# Patient Record
Sex: MALE | Race: WHITE | Employment: OTHER | URBAN - METROPOLITAN AREA
[De-identification: names, ages, dates, MRNs, and addresses within clinical notes are randomized per-mention and may not be internally consistent; named-entity substitution may affect disease eponyms.]

---

## 2018-08-09 ENCOUNTER — TELEPHONE (OUTPATIENT)
Dept: FAMILY MEDICINE CLINIC | Age: 83
End: 2018-08-09

## 2018-08-09 ENCOUNTER — OFFICE VISIT (OUTPATIENT)
Dept: FAMILY MEDICINE CLINIC | Age: 83
End: 2018-08-09

## 2018-08-09 VITALS
HEIGHT: 62 IN | WEIGHT: 112.6 LBS | BODY MASS INDEX: 20.72 KG/M2 | HEART RATE: 91 BPM | DIASTOLIC BLOOD PRESSURE: 68 MMHG | SYSTOLIC BLOOD PRESSURE: 112 MMHG | OXYGEN SATURATION: 97 % | RESPIRATION RATE: 16 BRPM

## 2018-08-09 DIAGNOSIS — I10 ESSENTIAL HYPERTENSION: ICD-10-CM

## 2018-08-09 DIAGNOSIS — Z91.81 HISTORY OF RECENT FALL: ICD-10-CM

## 2018-08-09 DIAGNOSIS — Z79.899 ON POTASSIUM WASTING DIURETIC THERAPY: ICD-10-CM

## 2018-08-09 DIAGNOSIS — J30.2 SEASONAL ALLERGIES: ICD-10-CM

## 2018-08-09 DIAGNOSIS — Z00.00 HEALTHCARE MAINTENANCE: ICD-10-CM

## 2018-08-09 DIAGNOSIS — R01.1 HEART MURMUR: ICD-10-CM

## 2018-08-09 DIAGNOSIS — R29.898 WEAKNESS OF BOTH LOWER EXTREMITIES: ICD-10-CM

## 2018-08-09 DIAGNOSIS — R41.3 MEMORY LOSS: ICD-10-CM

## 2018-08-09 DIAGNOSIS — R54 AGE-RELATED PHYSICAL DEBILITY: ICD-10-CM

## 2018-08-09 DIAGNOSIS — R23.3 EASY BRUISING: ICD-10-CM

## 2018-08-09 DIAGNOSIS — Z71.89 ADVANCED DIRECTIVES, COUNSELING/DISCUSSION: ICD-10-CM

## 2018-08-09 DIAGNOSIS — Z76.89 ESTABLISHING CARE WITH NEW DOCTOR, ENCOUNTER FOR: Primary | ICD-10-CM

## 2018-08-09 LAB
BASOPHILS ABSOLUTE: 0.1 K/UL (ref 0–0.2)
BASOPHILS RELATIVE PERCENT: 0.9 %
EOSINOPHILS ABSOLUTE: 0.2 K/UL (ref 0–0.6)
EOSINOPHILS RELATIVE PERCENT: 2.2 %
HCT VFR BLD CALC: 35.8 % (ref 40.5–52.5)
HEMOGLOBIN: 11.9 G/DL (ref 13.5–17.5)
LYMPHOCYTES ABSOLUTE: 1.6 K/UL (ref 1–5.1)
LYMPHOCYTES RELATIVE PERCENT: 23.9 %
MCH RBC QN AUTO: 31.4 PG (ref 26–34)
MCHC RBC AUTO-ENTMCNC: 33.4 G/DL (ref 31–36)
MCV RBC AUTO: 94 FL (ref 80–100)
MONOCYTES ABSOLUTE: 0.4 K/UL (ref 0–1.3)
MONOCYTES RELATIVE PERCENT: 5.6 %
NEUTROPHILS ABSOLUTE: 4.6 K/UL (ref 1.7–7.7)
NEUTROPHILS RELATIVE PERCENT: 67.4 %
PDW BLD-RTO: 14.9 % (ref 12.4–15.4)
PLATELET # BLD: 420 K/UL (ref 135–450)
PMV BLD AUTO: 8.3 FL (ref 5–10.5)
RBC # BLD: 3.8 M/UL (ref 4.2–5.9)
WBC # BLD: 6.9 K/UL (ref 4–11)

## 2018-08-09 PROCEDURE — 99387 INIT PM E/M NEW PAT 65+ YRS: CPT | Performed by: FAMILY MEDICINE

## 2018-08-09 RX ORDER — KETOCONAZOLE 20 MG/G
CREAM TOPICAL DAILY
COMMUNITY
End: 2018-11-06

## 2018-08-09 RX ORDER — ACETAMINOPHEN 500 MG
500 TABLET ORAL EVERY 6 HOURS PRN
COMMUNITY
End: 2020-01-01

## 2018-08-09 RX ORDER — AMLODIPINE BESYLATE 2.5 MG/1
5 TABLET ORAL DAILY
COMMUNITY
End: 2018-11-07 | Stop reason: SINTOL

## 2018-08-09 RX ORDER — POTASSIUM CHLORIDE 1.5 G/1.77G
20 POWDER, FOR SOLUTION ORAL DAILY
COMMUNITY
End: 2018-11-07

## 2018-08-09 RX ORDER — HYDROCHLOROTHIAZIDE 25 MG/1
12.5 TABLET ORAL DAILY
COMMUNITY
End: 2020-01-01

## 2018-08-09 RX ORDER — DONEPEZIL HYDROCHLORIDE 5 MG/1
5 TABLET, FILM COATED ORAL NIGHTLY
COMMUNITY
End: 2020-01-01

## 2018-08-09 RX ORDER — SULFACETAMIDE SODIUM 100 MG/G
OINTMENT OPHTHALMIC EVERY 6 HOURS
COMMUNITY
End: 2018-11-06

## 2018-08-09 RX ORDER — MIRTAZAPINE 15 MG/1
15 TABLET, FILM COATED ORAL NIGHTLY
COMMUNITY
End: 2018-11-06

## 2018-08-09 ASSESSMENT — ENCOUNTER SYMPTOMS
TROUBLE SWALLOWING: 0
SHORTNESS OF BREATH: 0
BLOOD IN STOOL: 0
CHEST TIGHTNESS: 0
COUGH: 1
COLOR CHANGE: 1
VOMITING: 0
NAUSEA: 0
SORE THROAT: 0
DIARRHEA: 0
RHINORRHEA: 0
CONSTIPATION: 0
ABDOMINAL PAIN: 0
BACK PAIN: 0

## 2018-08-09 ASSESSMENT — PATIENT HEALTH QUESTIONNAIRE - PHQ9
SUM OF ALL RESPONSES TO PHQ QUESTIONS 1-9: 0
1. LITTLE INTEREST OR PLEASURE IN DOING THINGS: 0
SUM OF ALL RESPONSES TO PHQ QUESTIONS 1-9: 0
2. FEELING DOWN, DEPRESSED OR HOPELESS: 0
SUM OF ALL RESPONSES TO PHQ9 QUESTIONS 1 & 2: 0

## 2018-08-09 NOTE — TELEPHONE ENCOUNTER
Hi!  This patient is newly established in our office. He came with his son, after spending a month in the hospital following a fall with subsequent rehab. The family is interested in setting up home health services and was told this can be done through our office. Can you help with this? ? I would appreciate any guidance. Thanks!

## 2018-08-09 NOTE — PROGRESS NOTES
Daphnie Nath  YOB: 1924    Date of Service:  8/9/2018    Chief Complaint:   Daphnie Nath is a 80 y.o. male who presents for complete physical examination and to establish care. HPI:    Lived in [place] by himself  Lorna Jose at home, likely mechanical but unwitnessed  Was in rehab and released Saturday   Unable to return home alone  Now lives with son, [facility] wanted him to be seen soon for [facility] orders    Was only taking 1 BP medication prior to hospitalization  He denies any recent medical history  Admitted to [facility]  Walking with a walker now  Unable to walk unassisted  Underwent inpt PT, but still feels weak and unsteady on his feet. Has had pneumococcal shot in the past  Gets flu shot every year   Unsure of other vaccines    Saw Rashaad Godfrey, JOHN  [address] [facility] [address]    Cough  Chronic  Nonproductive mostly, some phlegm, some orange but mostly white  No new or worsening SOB  No fever, WL  Never smoker, no Hx of COPD/Asthma     Exercise: no regular exercise at this time  Diet: appetite ok now,     Seatbelt use: yes   Guns in the home: no     Advanced Directives: discussed today     Wt Readings from Last 3 Encounters:   08/09/18 112 lb 9.6 oz (51.1 kg)     BP Readings from Last 3 Encounters:   08/09/18 112/68       Patient Active Problem List   Diagnosis    Essential hypertension    History of recent fall    Age-related physical debility    Healthcare maintenance    Easy bruising    Advanced directives, counseling/discussion       Health Maintenance   Topic Date Due    DTaP/Tdap/Td vaccine (1 - Tdap) 02[birth year]/1943    Shingles Vaccine (1 of 2 - 2 Dose Series) 02/12/1974    Pneumococcal low/med risk (1 of 2 - PCV13) 02/12/1989    Flu vaccine (1) 09/01/2018         There is no immunization history on file for this patient.     Allergies   Allergen Reactions    Claritin [Loratadine]      Current Outpatient Prescriptions   Medication Sig Dispense Refill    donepezil (ARICEPT) 5 MG tablet Take 5 mg by mouth nightly      mirtazapine (REMERON) 15 MG tablet Take 15 mg by mouth nightly      potassium chloride (KLOR-CON) 20 MEQ packet Take 20 mEq by mouth 2 times daily      amLODIPine (NORVASC) 2.5 MG tablet Take 2.5 mg by mouth daily      hydrochlorothiazide (HYDRODIURIL) 25 MG tablet Take 25 mg by mouth daily      acetaminophen (TYLENOL) 500 MG tablet Take 500 mg by mouth every 6 hours as needed for Pain      Fexofenadine HCl (ALLEGRA ALLERGY PO) Take by mouth      aspirin 81 MG tablet Take 81 mg by mouth daily      ketoconazole (NIZORAL) 2 % cream Apply topically daily Apply topically daily.  sulfacetamide (BLEPH-10) 10 % ophthalmic ointment every 6 hours Every 6 hours. No current facility-administered medications for this visit. Past Medical History:   Diagnosis Date    Allergic rhinitis     Dementia     Hypertension      Past Surgical History:   Procedure Laterality Date    APPENDECTOMY      PROSTATECTOMY  1998     Family History   Problem Relation Age of Onset    Family history unknown: Yes     Social History     Social History    Marital status: Unknown     Spouse name: N/A    Number of children: N/A    Years of education: N/A     Occupational History    Not on file. Social History Main Topics    Smoking status: Never Smoker    Smokeless tobacco: Never Used    Alcohol use No    Drug use: No    Sexual activity: No     Other Topics Concern    Not on file     Social History Narrative    No narrative on file       Review of Systems:  Review of Systems   Constitutional: Negative for activity change, appetite change, chills, diaphoresis, fatigue, fever and unexpected weight change. HENT: Negative for rhinorrhea, sore throat and trouble swallowing. Eyes: Negative for visual disturbance. Respiratory: Positive for cough (chronic). Negative for chest tightness and shortness of breath.     Cardiovascular: Positive for leg swelling (improved). Negative for chest pain and palpitations. Gastrointestinal: Negative for abdominal pain, blood in stool, constipation, diarrhea, nausea and vomiting. Genitourinary: Negative for decreased urine volume, difficulty urinating, discharge, dysuria, frequency, hematuria, penile swelling, scrotal swelling, testicular pain and urgency. Musculoskeletal: Positive for gait problem. Negative for arthralgias, back pain, joint swelling and myalgias. Skin: Positive for color change and wound. Negative for pallor and rash. Neurological: Negative for headaches. Hematological: Bruises/bleeds easily. Psychiatric/Behavioral: Positive for confusion. Negative for agitation, behavioral problems, decreased concentration, dysphoric mood, hallucinations, self-injury, sleep disturbance and suicidal ideas. The patient is not nervous/anxious and is not hyperactive. Physical Exam:   Vitals:    08/09/18 1334   BP: 112/68   Site: Right Arm   Position: Sitting   Cuff Size: Medium Adult   Pulse: 91   Resp: 16   SpO2: 97%   Weight: 112 lb 9.6 oz (51.1 kg)   Height: 5' 2\" (1.575 m)     Body mass index is 20.59 kg/m². Physical Exam   Constitutional: He appears well-developed and well-nourished. No distress. Appears stated age, debilitated, sits in walker   HENT:   Head: Normocephalic and atraumatic. Nose: Nose normal.   Mouth/Throat: Oropharynx is clear and moist. No oropharyngeal exudate. Eyes: EOM are normal. Pupils are equal, round, and reactive to light. Right eye exhibits no discharge. Left eye exhibits no discharge. Neck: Normal range of motion. Neck supple. No JVD present. No tracheal deviation present. No thyromegaly present. Cardiovascular: Normal rate and intact distal pulses. Exam reveals no gallop and no friction rub. Murmur (systolic) heard. Pulmonary/Chest: Effort normal and breath sounds normal. No respiratory distress. He has no wheezes. He has no rales.  He exhibits no

## 2018-08-09 NOTE — TELEPHONE ENCOUNTER
Please obtain previous records from recent hospitalization for this patient:  [facility]   Address: [address] [facility] [address] [facility] [address] [facility]  (paritcularly any imaging/lab work)      And also from his previous PCP's office:  Vahid Velasquez NP  [address]      Thanks!

## 2018-08-10 LAB
ANION GAP SERPL CALCULATED.3IONS-SCNC: 13 MMOL/L (ref 3–16)
BUN BLDV-MCNC: 26 MG/DL (ref 7–20)
CALCIUM SERPL-MCNC: 9 MG/DL (ref 8.3–10.6)
CHLORIDE BLD-SCNC: 108 MMOL/L (ref 99–110)
CO2: 26 MMOL/L (ref 21–32)
CREAT SERPL-MCNC: 0.8 MG/DL (ref 0.8–1.3)
GFR AFRICAN AMERICAN: >60
GFR NON-AFRICAN AMERICAN: >60
GLUCOSE BLD-MCNC: 122 MG/DL (ref 70–99)
POTASSIUM SERPL-SCNC: 4.5 MMOL/L (ref 3.5–5.1)
SODIUM BLD-SCNC: 147 MMOL/L (ref 136–145)
TSH REFLEX: 1.6 UIU/ML (ref 0.27–4.2)

## 2018-08-13 ENCOUNTER — TELEPHONE (OUTPATIENT)
Dept: FAMILY MEDICINE CLINIC | Age: 83
End: 2018-08-13

## 2018-08-13 PROBLEM — G30.1 LATE ONSET ALZHEIMER'S DISEASE WITH BEHAVIORAL DISTURBANCE (HCC): Status: ACTIVE | Noted: 2018-08-13

## 2018-08-13 PROBLEM — F02.818 LATE ONSET ALZHEIMER'S DISEASE WITH BEHAVIORAL DISTURBANCE (HCC): Status: ACTIVE | Noted: 2018-08-13

## 2018-08-13 PROBLEM — M19.90 OA (OSTEOARTHRITIS): Status: ACTIVE | Noted: 2018-08-13

## 2018-08-15 ENCOUNTER — TELEPHONE (OUTPATIENT)
Dept: FAMILY MEDICINE CLINIC | Age: 83
End: 2018-08-15

## 2018-08-16 ENCOUNTER — TELEPHONE (OUTPATIENT)
Dept: FAMILY MEDICINE CLINIC | Age: 83
End: 2018-08-16

## 2018-08-16 ENCOUNTER — OFFICE VISIT (OUTPATIENT)
Dept: FAMILY MEDICINE CLINIC | Age: 83
End: 2018-08-16

## 2018-08-16 ENCOUNTER — CARE COORDINATION (OUTPATIENT)
Dept: FAMILY MEDICINE CLINIC | Age: 83
End: 2018-08-16

## 2018-08-16 VITALS
RESPIRATION RATE: 16 BRPM | SYSTOLIC BLOOD PRESSURE: 130 MMHG | WEIGHT: 123 LBS | OXYGEN SATURATION: 98 % | BODY MASS INDEX: 22.5 KG/M2 | HEART RATE: 87 BPM | DIASTOLIC BLOOD PRESSURE: 80 MMHG | TEMPERATURE: 98.1 F

## 2018-08-16 DIAGNOSIS — J40 BRONCHITIS: Primary | ICD-10-CM

## 2018-08-16 DIAGNOSIS — R05.9 COUGH: ICD-10-CM

## 2018-08-16 DIAGNOSIS — R50.9 FEVER, UNSPECIFIED FEVER CAUSE: ICD-10-CM

## 2018-08-16 PROCEDURE — 99213 OFFICE O/P EST LOW 20 MIN: CPT | Performed by: NURSE PRACTITIONER

## 2018-08-16 RX ORDER — AZITHROMYCIN 250 MG/1
TABLET, FILM COATED ORAL
Qty: 6 TABLET | Refills: 0 | Status: SHIPPED | OUTPATIENT
Start: 2018-08-16 | End: 2018-08-26

## 2018-08-16 ASSESSMENT — ENCOUNTER SYMPTOMS
SHORTNESS OF BREATH: 0
BACK PAIN: 0
NAUSEA: 0
COUGH: 1
CONSTIPATION: 0
CHEST TIGHTNESS: 0

## 2018-08-16 NOTE — TELEPHONE ENCOUNTER
Left message for Son Eddie Garcia) to call back. Need to find out If he is better or does he need an OV?

## 2018-08-20 ENCOUNTER — CARE COORDINATION (OUTPATIENT)
Dept: CARE COORDINATION | Age: 83
End: 2018-08-20

## 2018-08-20 ENCOUNTER — TELEPHONE (OUTPATIENT)
Dept: FAMILY MEDICINE CLINIC | Age: 83
End: 2018-08-20

## 2018-08-20 NOTE — TELEPHONE ENCOUNTER
Home Care was started over the weekend and they are asking for a verbal order for OT eval. Please call [facility] [phone]  Will you approve for Dr. Gretel Garrison

## 2018-08-21 NOTE — TELEPHONE ENCOUNTER
Yes. Confirmed Home health in place and visit made. Will have the front call and schedule appt for 3-4 weeks.

## 2018-08-24 ENCOUNTER — TELEPHONE (OUTPATIENT)
Dept: FAMILY MEDICINE CLINIC | Age: 83
End: 2018-08-24

## 2018-08-30 ENCOUNTER — TELEPHONE (OUTPATIENT)
Dept: FAMILY MEDICINE CLINIC | Age: 83
End: 2018-08-30

## 2018-08-30 NOTE — TELEPHONE ENCOUNTER
Home care nurse calling to report that she believes Nereida Rooney is experiencing increased sundowners. He is showing a lot of agression, wondering, and inappropriate actions and words.   ? What to do

## 2018-09-04 ENCOUNTER — APPOINTMENT (OUTPATIENT)
Dept: GENERAL RADIOLOGY | Age: 83
DRG: 469 | End: 2018-09-04
Payer: MEDICARE

## 2018-09-04 ENCOUNTER — ANESTHESIA EVENT (OUTPATIENT)
Dept: OPERATING ROOM | Age: 83
DRG: 469 | End: 2018-09-04
Payer: MEDICARE

## 2018-09-04 ENCOUNTER — HOSPITAL ENCOUNTER (INPATIENT)
Age: 83
LOS: 7 days | Discharge: NURSING, REHAB OR LONG-TERM CARE | DRG: 469 | End: 2018-09-11
Attending: EMERGENCY MEDICINE | Admitting: FAMILY MEDICINE
Payer: MEDICARE

## 2018-09-04 ENCOUNTER — APPOINTMENT (OUTPATIENT)
Dept: CT IMAGING | Age: 83
DRG: 469 | End: 2018-09-04
Payer: MEDICARE
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
## 2018-09-04 DIAGNOSIS — S72.002A CLOSED FRACTURE OF NECK OF LEFT FEMUR, INITIAL ENCOUNTER (HCC): Primary | ICD-10-CM

## 2018-09-04 DIAGNOSIS — S09.90XA CLOSED HEAD INJURY, INITIAL ENCOUNTER: ICD-10-CM

## 2018-09-04 LAB
A/G RATIO: 1.2 (ref 1.1–2.2)
ALBUMIN SERPL-MCNC: 3.6 G/DL (ref 3.4–5)
ALP BLD-CCNC: 79 U/L (ref 40–129)
ALT SERPL-CCNC: 14 U/L (ref 10–40)
ANION GAP SERPL CALCULATED.3IONS-SCNC: 12 MMOL/L (ref 3–16)
AST SERPL-CCNC: 19 U/L (ref 15–37)
BASOPHILS ABSOLUTE: 0.1 K/UL (ref 0–0.2)
BASOPHILS RELATIVE PERCENT: 1 %
BILIRUB SERPL-MCNC: 0.3 MG/DL (ref 0–1)
BUN BLDV-MCNC: 17 MG/DL (ref 7–20)
CALCIUM SERPL-MCNC: 9 MG/DL (ref 8.3–10.6)
CHLORIDE BLD-SCNC: 104 MMOL/L (ref 99–110)
CO2: 26 MMOL/L (ref 21–32)
CREAT SERPL-MCNC: 0.7 MG/DL (ref 0.8–1.3)
EOSINOPHILS ABSOLUTE: 0.1 K/UL (ref 0–0.6)
EOSINOPHILS RELATIVE PERCENT: 2 %
GFR AFRICAN AMERICAN: >60
GFR NON-AFRICAN AMERICAN: >60
GLOBULIN: 3 G/DL
GLUCOSE BLD-MCNC: 103 MG/DL (ref 70–99)
HCT VFR BLD CALC: 40.1 % (ref 40.5–52.5)
HEMOGLOBIN: 13.3 G/DL (ref 13.5–17.5)
LYMPHOCYTES ABSOLUTE: 2 K/UL (ref 1–5.1)
LYMPHOCYTES RELATIVE PERCENT: 29.4 %
MCH RBC QN AUTO: 30.8 PG (ref 26–34)
MCHC RBC AUTO-ENTMCNC: 33.2 G/DL (ref 31–36)
MCV RBC AUTO: 92.9 FL (ref 80–100)
MONOCYTES ABSOLUTE: 0.4 K/UL (ref 0–1.3)
MONOCYTES RELATIVE PERCENT: 6 %
NEUTROPHILS ABSOLUTE: 4.2 K/UL (ref 1.7–7.7)
NEUTROPHILS RELATIVE PERCENT: 61.6 %
PDW BLD-RTO: 14.8 % (ref 12.4–15.4)
PLATELET # BLD: 227 K/UL (ref 135–450)
PMV BLD AUTO: 8.1 FL (ref 5–10.5)
POTASSIUM SERPL-SCNC: 4.9 MMOL/L (ref 3.5–5.1)
RBC # BLD: 4.32 M/UL (ref 4.2–5.9)
SODIUM BLD-SCNC: 142 MMOL/L (ref 136–145)
SPECIMEN STATUS: NORMAL
TOTAL PROTEIN: 6.6 G/DL (ref 6.4–8.2)
WBC # BLD: 6.9 K/UL (ref 4–11)

## 2018-09-04 PROCEDURE — 80053 COMPREHEN METABOLIC PANEL: CPT

## 2018-09-04 PROCEDURE — 86850 RBC ANTIBODY SCREEN: CPT

## 2018-09-04 PROCEDURE — 2580000003 HC RX 258: Performed by: HOSPITALIST

## 2018-09-04 PROCEDURE — 93005 ELECTROCARDIOGRAM TRACING: CPT | Performed by: EMERGENCY MEDICINE

## 2018-09-04 PROCEDURE — 99285 EMERGENCY DEPT VISIT HI MDM: CPT

## 2018-09-04 PROCEDURE — 86901 BLOOD TYPING SEROLOGIC RH(D): CPT

## 2018-09-04 PROCEDURE — 6370000000 HC RX 637 (ALT 250 FOR IP): Performed by: HOSPITALIST

## 2018-09-04 PROCEDURE — 73502 X-RAY EXAM HIP UNI 2-3 VIEWS: CPT

## 2018-09-04 PROCEDURE — 86900 BLOOD TYPING SEROLOGIC ABO: CPT

## 2018-09-04 PROCEDURE — 6360000002 HC RX W HCPCS: Performed by: HOSPITALIST

## 2018-09-04 PROCEDURE — 72125 CT NECK SPINE W/O DYE: CPT

## 2018-09-04 PROCEDURE — 2580000003 HC RX 258: Performed by: EMERGENCY MEDICINE

## 2018-09-04 PROCEDURE — 93010 ELECTROCARDIOGRAM REPORT: CPT | Performed by: INTERNAL MEDICINE

## 2018-09-04 PROCEDURE — 6360000002 HC RX W HCPCS: Performed by: EMERGENCY MEDICINE

## 2018-09-04 PROCEDURE — 85025 COMPLETE CBC W/AUTO DIFF WBC: CPT

## 2018-09-04 PROCEDURE — 96374 THER/PROPH/DIAG INJ IV PUSH: CPT

## 2018-09-04 PROCEDURE — 70450 CT HEAD/BRAIN W/O DYE: CPT

## 2018-09-04 PROCEDURE — 1200000000 HC SEMI PRIVATE

## 2018-09-04 RX ORDER — SODIUM CHLORIDE 0.9 % (FLUSH) 0.9 %
10 SYRINGE (ML) INJECTION EVERY 12 HOURS SCHEDULED
Status: DISCONTINUED | OUTPATIENT
Start: 2018-09-04 | End: 2018-09-11 | Stop reason: HOSPADM

## 2018-09-04 RX ORDER — AMLODIPINE BESYLATE 2.5 MG/1
2.5 TABLET ORAL DAILY
Status: DISCONTINUED | OUTPATIENT
Start: 2018-09-05 | End: 2018-09-11 | Stop reason: HOSPADM

## 2018-09-04 RX ORDER — OXYCODONE HYDROCHLORIDE 5 MG/1
5 TABLET ORAL EVERY 4 HOURS PRN
Status: DISCONTINUED | OUTPATIENT
Start: 2018-09-04 | End: 2018-09-11 | Stop reason: HOSPADM

## 2018-09-04 RX ORDER — SODIUM CHLORIDE 9 MG/ML
INJECTION, SOLUTION INTRAVENOUS CONTINUOUS
Status: DISCONTINUED | OUTPATIENT
Start: 2018-09-04 | End: 2018-09-10

## 2018-09-04 RX ORDER — MORPHINE SULFATE 2 MG/ML
2 INJECTION, SOLUTION INTRAMUSCULAR; INTRAVENOUS EVERY 4 HOURS PRN
Status: DISCONTINUED | OUTPATIENT
Start: 2018-09-04 | End: 2018-09-06

## 2018-09-04 RX ORDER — SODIUM CHLORIDE 9 MG/ML
INJECTION, SOLUTION INTRAVENOUS CONTINUOUS
Status: DISCONTINUED | OUTPATIENT
Start: 2018-09-04 | End: 2018-09-04

## 2018-09-04 RX ORDER — MIRTAZAPINE 15 MG/1
15 TABLET, FILM COATED ORAL NIGHTLY
Status: DISCONTINUED | OUTPATIENT
Start: 2018-09-04 | End: 2018-09-11 | Stop reason: HOSPADM

## 2018-09-04 RX ORDER — SODIUM CHLORIDE 0.9 % (FLUSH) 0.9 %
10 SYRINGE (ML) INJECTION PRN
Status: DISCONTINUED | OUTPATIENT
Start: 2018-09-04 | End: 2018-09-11 | Stop reason: HOSPADM

## 2018-09-04 RX ORDER — 0.9 % SODIUM CHLORIDE 0.9 %
500 INTRAVENOUS SOLUTION INTRAVENOUS ONCE
Status: COMPLETED | OUTPATIENT
Start: 2018-09-04 | End: 2018-09-04

## 2018-09-04 RX ORDER — ONDANSETRON 2 MG/ML
4 INJECTION INTRAMUSCULAR; INTRAVENOUS EVERY 6 HOURS PRN
Status: DISCONTINUED | OUTPATIENT
Start: 2018-09-04 | End: 2018-09-11 | Stop reason: HOSPADM

## 2018-09-04 RX ORDER — SENNA AND DOCUSATE SODIUM 50; 8.6 MG/1; MG/1
2 TABLET, FILM COATED ORAL 2 TIMES DAILY
Status: DISCONTINUED | OUTPATIENT
Start: 2018-09-04 | End: 2018-09-11 | Stop reason: HOSPADM

## 2018-09-04 RX ORDER — ACETAMINOPHEN 500 MG
1000 TABLET ORAL 3 TIMES DAILY
Status: DISCONTINUED | OUTPATIENT
Start: 2018-09-04 | End: 2018-09-11 | Stop reason: HOSPADM

## 2018-09-04 RX ORDER — DONEPEZIL HYDROCHLORIDE 5 MG/1
5 TABLET, FILM COATED ORAL NIGHTLY
Status: DISCONTINUED | OUTPATIENT
Start: 2018-09-04 | End: 2018-09-11 | Stop reason: HOSPADM

## 2018-09-04 RX ORDER — MORPHINE SULFATE 4 MG/ML
4 INJECTION, SOLUTION INTRAMUSCULAR; INTRAVENOUS
Status: DISCONTINUED | OUTPATIENT
Start: 2018-09-04 | End: 2018-09-04

## 2018-09-04 RX ADMIN — ACETAMINOPHEN 1000 MG: 500 TABLET ORAL at 17:52

## 2018-09-04 RX ADMIN — SODIUM CHLORIDE 500 ML: 9 INJECTION, SOLUTION INTRAVENOUS at 14:29

## 2018-09-04 RX ADMIN — SODIUM CHLORIDE, PRESERVATIVE FREE 10 ML: 5 INJECTION INTRAVENOUS at 23:08

## 2018-09-04 RX ADMIN — OXYCODONE HYDROCHLORIDE 5 MG: 5 TABLET ORAL at 17:52

## 2018-09-04 RX ADMIN — MORPHINE SULFATE 2 MG: 2 INJECTION, SOLUTION INTRAMUSCULAR; INTRAVENOUS at 23:07

## 2018-09-04 RX ADMIN — SODIUM CHLORIDE: 9 INJECTION, SOLUTION INTRAVENOUS at 17:53

## 2018-09-04 RX ADMIN — MORPHINE SULFATE 4 MG: 4 INJECTION, SOLUTION INTRAMUSCULAR; INTRAVENOUS at 14:29

## 2018-09-04 RX ADMIN — SODIUM CHLORIDE: 9 INJECTION, SOLUTION INTRAVENOUS at 14:50

## 2018-09-04 ASSESSMENT — PAIN SCALES - GENERAL
PAINLEVEL_OUTOF10: 10
PAINLEVEL_OUTOF10: 8
PAINLEVEL_OUTOF10: 10
PAINLEVEL_OUTOF10: 2

## 2018-09-04 ASSESSMENT — PAIN DESCRIPTION - PAIN TYPE: TYPE: ACUTE PAIN

## 2018-09-04 ASSESSMENT — PAIN SCALES - WONG BAKER: WONGBAKER_NUMERICALRESPONSE: 8

## 2018-09-04 NOTE — PROGRESS NOTES
Paged Dr. Lu Landrum @ [phone]  Re-paged @ [phone]  Dr. Lu Landrum consulted with Dr. Najma Hairston @ [phone].

## 2018-09-04 NOTE — ED PROVIDER NOTES
Emergency Physician Note    Chief Complaint  Fall (witnessed fall at home; pain in left leg/hip; shortened and rotated, patient placed in t-pod by EMS. Family reports patient hit head but did not lose conciousness ) and Hip Pain       History of Present Illness  Rufino Dye is a 80 y.o. male who presents to the ED for acute onset of left hip pain status post mechanical fall. Patient was attempting to go to the bathroom on his own, he twisted the wrong way and lost his balance and fell on the left side. Had immediate pain to his left hip. He believes he hit his head as well but did not lose consciousness. Denies headache, denies decreased vision changes. Denies fever, chills, malaise, chest pain, shortness of breath, cough, abdominal pain, nausea, vomiting, diarrhea, headache, sore throat, dysuria, back pain, rash. No palliative/provocative factors. Positives and pertinent negatives as per HPI. All other of the 10 systems were reviewed and are negative. I have reviewed the following from the nursing documentation:      Prior to Admission medications    Medication Sig Start Date End Date Taking?  Authorizing Provider   donepezil (ARICEPT) 5 MG tablet Take 5 mg by mouth nightly   Yes Historical Provider, MD   mirtazapine (REMERON) 15 MG tablet Take 15 mg by mouth nightly   Yes Historical Provider, MD   potassium chloride (KLOR-CON) 20 MEQ packet Take 20 mEq by mouth 2 times daily   Yes Historical Provider, MD   amLODIPine (NORVASC) 2.5 MG tablet Take 2.5 mg by mouth daily   Yes Historical Provider, MD   hydrochlorothiazide (HYDRODIURIL) 25 MG tablet Take 25 mg by mouth daily   Yes Historical Provider, MD   acetaminophen (TYLENOL) 500 MG tablet Take 500 mg by mouth every 6 hours as needed for Pain   Yes Historical Provider, MD   Fexofenadine HCl (ALLEGRA ALLERGY PO) Take by mouth   Yes Historical Provider, MD   aspirin 81 MG tablet Take 81 mg by mouth daily   Yes Historical Provider, MD ketoconazole (NIZORAL) 2 % cream Apply topically daily Apply topically daily. Yes Historical Provider, MD   sulfacetamide (BLEPH-10) 10 % ophthalmic ointment every 6 hours Every 6 hours. Yes Historical Provider, MD       Allergies as of 09/04/2018 - Review Complete 09/04/2018   Allergen Reaction Noted    Claritin [loratadine]  08/09/2018       Past Medical History:   Diagnosis Date    Allergic rhinitis     Dementia     Hypertension     OA (osteoarthritis) 8/13/2018        Surgical History:   Past Surgical History:   Procedure Laterality Date    APPENDECTOMY      PROSTATECTOMY  1998        Family History:    Family History   Problem Relation Age of Onset    Family history unknown: Yes       Social History     Social History    Marital status: Unknown     Spouse name: N/A    Number of children: N/A    Years of education: N/A     Occupational History    Not on file. Social History Main Topics    Smoking status: Never Smoker    Smokeless tobacco: Never Used    Alcohol use No    Drug use: No    Sexual activity: No     Other Topics Concern    Not on file     Social History Narrative    No narrative on file       Nursing notes reviewed. ED Triage Vitals [09/04/18 1256]   Enc Vitals Group      BP (!) 158/89      Pulse 92      Resp 20      Temp 97.8 °F (36.6 °C)      Temp Source Oral      SpO2 96 %      Weight 120 lb (54.4 kg)      Height 5' 10\" (1.778 m)      Head Circumference       Peak Flow       Pain Score       Pain Loc       Pain Edu? Excl. in [address]? GENERAL:  Awake, alert. Cachectic, well nourished with no apparent distress. HENT:  Normocephalic, Atraumatic, no lacerations. EYES:  Conjunctiva normal, Pupils equal round and reactive to light, extraocular movements normal.  NECK:  Trachea is midline. No stridor. CHEST:  Regular rate and rhythm, no murmurs/rubs/gallops, normal S1/S2, chest wall non-tender. LUNGS:  No respiratory distress.   No abdominal retractions, no sternal retractions. Clear to auscultation bilaterally, no wheezing, no rhochi, no rales  ABDOMEN:  Soft, non-tender, non-distended. No guarding and no rebound. No costovertebral angle tenderness to palpation. Normal BS, no organomegaly, no abdominal masses  EXTREMITIES:  Normal range of motion, no edema,  distal pulses present. Moderate left hip tenderness to palpation, left leg is foreshortened and externally rotated, no open wound on the left hip  BACK:  No midline tenderness in the cervical, thoracic, and lumbar spine. No deformities, no step-off. Palpation did not elicit any paraspinous tenderness. SKIN: Warm, dry and intact. NEUROLOGIC: Normal mental status. Moving all extremities to command. Alert and oriented x4 without focal deficit or gross sensory deficit. Normal speech. PSYCHIATRIC: Not anxious, normal mood and affect, thoughts are linear and organized, without delusions/hallucinations, responds appropriately to questions      LABS and DIAGNOSTIC RESULTS  EKG  The Ekg interpreted by me shows  normal sinus rhythm and First degree AV block and infrequent PVCs with a rate of 98  Axis is   Normal  QTc is  normal  Intervals and Durations are unremarkable. ST Segments: normal  Delta waves, Brugada Syndrome, and Short ID are not present. No significant change from prior EKG dated none available    RADIOLOGY  X-RAYS:  I have reviewed radiologic plain film image(s). ALL OTHER NON-PLAIN FILM IMAGES SUCH AS CT, ULTRASOUND AND MRI HAVE BEEN READ BY THE RADIOLOGIST. CT CERVICAL SPINE WO CONTRAST   Final Result   No acute abnormality of the cervical spine. Moderate multilevel degenerative changes. CT HEAD WO CONTRAST   Final Result   No acute intracranial abnormality. Fluid level in the right maxillary sinus could represent sinusitis. XR HIP LEFT (2-3 VIEWS)   Final Result   1. Foreshortening of the left femoral neck is suspicious for an acute   fracture.          XR HIP LEFT (2-3 VIEWS)    (Results Pending)       Hip X-Ray    Interpreted by: Emergency Department Physician    Findings: Left hip: Fracture- Femoral neck      LABS  Results for orders placed or performed during the hospital encounter of 09/04/18   CBC Auto Differential   Result Value Ref Range    WBC 6.9 4.0 - 11.0 K/uL    RBC 4.32 4.20 - 5.90 M/uL    Hemoglobin 13.3 (L) 13.5 - 17.5 g/dL    Hematocrit 40.1 (L) 40.5 - 52.5 %    MCV 92.9 80.0 - 100.0 fL    MCH 30.8 26.0 - 34.0 pg    MCHC 33.2 31.0 - 36.0 g/dL    RDW 14.8 12.4 - 15.4 %    Platelets 194 119 - 308 K/uL    MPV 8.1 5.0 - 10.5 fL    Neutrophils % 61.6 %    Lymphocytes % 29.4 %    Monocytes % 6.0 %    Eosinophils % 2.0 %    Basophils % 1.0 %    Neutrophils # 4.2 1.7 - 7.7 K/uL    Lymphocytes # 2.0 1.0 - 5.1 K/uL    Monocytes # 0.4 0.0 - 1.3 K/uL    Eosinophils # 0.1 0.0 - 0.6 K/uL    Basophils # 0.1 0.0 - 0.2 K/uL   Comprehensive Metabolic Panel   Result Value Ref Range    Sodium 142 136 - 145 mmol/L    Potassium 4.9 3.5 - 5.1 mmol/L    Chloride 104 99 - 110 mmol/L    CO2 26 21 - 32 mmol/L    Anion Gap 12 3 - 16    Glucose 103 (H) 70 - 99 mg/dL    BUN 17 7 - 20 mg/dL    CREATININE 0.7 (L) 0.8 - 1.3 mg/dL    GFR Non-African American >60 >60    GFR African American >60 >60    Calcium 9.0 8.3 - 10.6 mg/dL    Total Protein 6.6 6.4 - 8.2 g/dL    Alb 3.6 3.4 - 5.0 g/dL    Albumin/Globulin Ratio 1.2 1.1 - 2.2    Total Bilirubin 0.3 0.0 - 1.0 mg/dL    Alkaline Phosphatase 79 40 - 129 U/L    ALT 14 10 - 40 U/L    AST 19 15 - 37 U/L    Globulin 3.0 g/dL   Sample possible blood bank testing   Result Value Ref Range    Specimen Status MANJEET    EKG 12 Lead   Result Value Ref Range    Ventricular Rate 98 BPM    Atrial Rate 98 BPM    P-R Interval 242 ms    QRS Duration 80 ms    Q-T Interval 346 ms    QTc Calculation (Bazett) 441 ms    P Axis 113 degrees    R Axis 6 degrees    T Axis 36 degrees    Diagnosis       Sinus rhythm with 1st degree A-V block with Premature atrial

## 2018-09-04 NOTE — H&P
Hospital Medicine History & Physical      PCP: Jin Pastor MD    Date of Admission: 9/4/2018    Date of Service: Pt seen/examined on 9/4 and Admitted to Inpatient with expected LOS greater than two midnights due to medical therapy    Chief Complaint:  Fall, leg pain      History Of Present Illness:   80 y.o. male who presented to [facility] with accidental fall and subsequent L hip pain. Hx obtained from pt and family given pt with advanced dementia. Hx of falls, per chart review, had a couple falls recently at home req short rehab stay. Fell walking to bathroom, slipped, twisted and fell onto L side. No LOC. Hx of HTN, OA, Alzheimer's dementia. At present, notes L leg pain, 7/10, meds helping. Past Medical History:          Diagnosis Date    Allergic rhinitis     Dementia     Hypertension     OA (osteoarthritis) 8/13/2018       Past Surgical History:          Procedure Laterality Date    APPENDECTOMY      PROSTATECTOMY  1998       Medications Prior to Admission:      Prior to Admission medications    Medication Sig Start Date End Date Taking?  Authorizing Provider   donepezil (ARICEPT) 5 MG tablet Take 5 mg by mouth nightly   Yes Historical Provider, MD   mirtazapine (REMERON) 15 MG tablet Take 15 mg by mouth nightly   Yes Historical Provider, MD   potassium chloride (KLOR-CON) 20 MEQ packet Take 20 mEq by mouth 2 times daily   Yes Historical Provider, MD   amLODIPine (NORVASC) 2.5 MG tablet Take 2.5 mg by mouth daily   Yes Historical Provider, MD   hydrochlorothiazide (HYDRODIURIL) 25 MG tablet Take 25 mg by mouth daily   Yes Historical Provider, MD   acetaminophen (TYLENOL) 500 MG tablet Take 500 mg by mouth every 6 hours as needed for Pain   Yes Historical Provider, MD   Fexofenadine HCl (ALLEGRA ALLERGY PO) Take by mouth   Yes Historical Provider, MD   aspirin 81 MG tablet Take 81 mg by mouth daily   Yes Historical Provider, MD   ketoconazole (NIZORAL) 2 % cream Apply topically daily Apply topically daily. Yes Historical Provider, MD   sulfacetamide (BLEPH-10) 10 % ophthalmic ointment every 6 hours Every 6 hours. Yes Historical Provider, MD       Allergies:  Claritin [loratadine]    Social History:      The patient currently lives at home alone    TOBACCO:   reports that he has never smoked. He has never used smokeless tobacco.  ETOH:   reports that he does not drink alcohol. Family History:       Reviewed in detail and negative for DM, CAD, Cancer, CVA. REVIEW OF SYSTEMS:   Pertinent positives as noted in the HPI. All other systems reviewed and negative. PHYSICAL EXAM PERFORMED:    BP (!) 148/87   Pulse 92   Temp 98 °F (36.7 °C)   Resp 20   Ht 5' 10\" (1.778 m)   Wt 120 lb (54.4 kg)   SpO2 96%   BMI 17.22 kg/m²     General appearance:  No apparent distress, appears stated age and cooperative. HEENT:  Normal cephalic, atraumatic without obvious deformity. Pupils equal, round, and reactive to light. Extra ocular muscles intact. Conjunctivae/corneas clear. Neck: Supple, with full range of motion. No jugular venous distention. Trachea midline. Respiratory:  Normal respiratory effort. Clear to auscultation, bilaterally without Rales/Wheezes/Rhonchi. Cardiovascular:  Regular rate and rhythm with normal S1/S2 without murmurs, rubs or gallops. Abdomen: Soft, non-tender, non-distended with normal bowel sounds. Musculoskeletal:  No clubbing, cyanosis or edema bilaterally. Full range of motion without deformity. Skin: Skin color, texture, turgor normal.  No rashes or lesions. Neurologic:  Neurovascularly intact without any focal sensory/motor deficits.    Psychiatric:  Alert and oriented to person alone, no agitation at present  Capillary Refill: Brisk,< 3 seconds   Peripheral Pulses: +2 palpable, equal bilaterally       Labs:     Recent Labs      09/04/18   1312   WBC  6.9   HGB  13.3*   HCT  40.1*   PLT  227     Recent Labs      09/04/18   1312   NA  142   K  4.9   CL 104   CO2  26   BUN  17   CREATININE  0.7*   CALCIUM  9.0     Recent Labs      09/04/18   1312   AST  19   ALT  14   BILITOT  0.3   ALKPHOS  79     No results for input(s): INR in the last 72 hours. No results for input(s): Claudia Jordan in the last 72 hours. Urinalysis:    No results found for: Ki Piña, BACTERIA, [address] [facility], [place], [address], [place] [address]    Radiology:     EKG:  I have reviewed the EKG with the following interpretation: NSR, 1st deg AV block    CT CERVICAL SPINE WO CONTRAST   Final Result   No acute abnormality of the cervical spine. Moderate multilevel degenerative changes. CT HEAD WO CONTRAST   Final Result   No acute intracranial abnormality. Fluid level in the right maxillary sinus could represent sinusitis. XR HIP LEFT (2-3 VIEWS)   Final Result   1. Foreshortening of the left femoral neck is suspicious for an acute   fracture. XR HIP LEFT (2-3 VIEWS)    (Results Pending)       ASSESSMENT:    Active Hospital Problems    Diagnosis     Closed fracture of neck of left femur ([facility] [address] [S72.002A]     Late onset Alzheimer's disease with behavioral disturbance [G30.1, F02.81]     OA (osteoarthritis) [M19.90]     Essential hypertension [I10]      PLAN:  Admit to med surg    AM labs, incl Vit D level    MIVF with 0.9 % NS, 50 ml/hr for now given adv age    Bedrest for now, pain control with prn IV Morphine, PO Roxicodone, sched APAP 1 gm tid, sched Senna S 2 tabs bid    Ortho consulted, tentative plan for surgical intervention in am    Pt to undergo moderate risk non cardiac surgery. Hx of HTN, otherwise without cardiac problems. Denies active CP, recent NELSON, SOB. Not on B blocker nor Statin. Given lack of active MI, CHF, pt ok to proceed for surgery from IM standpoint.       Cont home Norvasc, hold home HCTZ and sched KCL for now    Cont home Aricept, monitor for delirium    DVT Prophylaxis: Lovenox, to start tomorrow postop  Diet:  Reg, NPO midnight  Code

## 2018-09-05 ENCOUNTER — ANESTHESIA (OUTPATIENT)
Dept: OPERATING ROOM | Age: 83
DRG: 469 | End: 2018-09-05
Payer: MEDICARE

## 2018-09-05 VITALS
RESPIRATION RATE: 13 BRPM | DIASTOLIC BLOOD PRESSURE: 78 MMHG | OXYGEN SATURATION: 91 % | SYSTOLIC BLOOD PRESSURE: 121 MMHG

## 2018-09-05 LAB
ABO/RH: NORMAL
ANION GAP SERPL CALCULATED.3IONS-SCNC: 8 MMOL/L (ref 3–16)
ANTIBODY SCREEN: NORMAL
BUN BLDV-MCNC: 20 MG/DL (ref 7–20)
CALCIUM SERPL-MCNC: 8.5 MG/DL (ref 8.3–10.6)
CHLORIDE BLD-SCNC: 104 MMOL/L (ref 99–110)
CO2: 28 MMOL/L (ref 21–32)
CREAT SERPL-MCNC: 0.8 MG/DL (ref 0.8–1.3)
GFR AFRICAN AMERICAN: >60
GFR NON-AFRICAN AMERICAN: >60
GLUCOSE BLD-MCNC: 115 MG/DL (ref 70–99)
HCT VFR BLD CALC: 36.4 % (ref 40.5–52.5)
HEMOGLOBIN: 12.1 G/DL (ref 13.5–17.5)
MAGNESIUM: 2 MG/DL (ref 1.8–2.4)
MCH RBC QN AUTO: 30.8 PG (ref 26–34)
MCHC RBC AUTO-ENTMCNC: 33.1 G/DL (ref 31–36)
MCV RBC AUTO: 93.1 FL (ref 80–100)
PDW BLD-RTO: 14.6 % (ref 12.4–15.4)
PLATELET # BLD: 196 K/UL (ref 135–450)
PMV BLD AUTO: 7.8 FL (ref 5–10.5)
POTASSIUM REFLEX MAGNESIUM: 4.4 MMOL/L (ref 3.5–5.1)
RBC # BLD: 3.91 M/UL (ref 4.2–5.9)
SODIUM BLD-SCNC: 140 MMOL/L (ref 136–145)
VITAMIN D 25-HYDROXY: 27.9 NG/ML
WBC # BLD: 7.9 K/UL (ref 4–11)

## 2018-09-05 PROCEDURE — 88311 DECALCIFY TISSUE: CPT

## 2018-09-05 PROCEDURE — 2500000003 HC RX 250 WO HCPCS: Performed by: ANESTHESIOLOGY

## 2018-09-05 PROCEDURE — 2580000003 HC RX 258: Performed by: HOSPITALIST

## 2018-09-05 PROCEDURE — 3600000005 HC SURGERY LEVEL 5 BASE: Performed by: ORTHOPAEDIC SURGERY

## 2018-09-05 PROCEDURE — 6360000002 HC RX W HCPCS

## 2018-09-05 PROCEDURE — 7100000001 HC PACU RECOVERY - ADDTL 15 MIN: Performed by: ORTHOPAEDIC SURGERY

## 2018-09-05 PROCEDURE — 2580000003 HC RX 258: Performed by: ORTHOPAEDIC SURGERY

## 2018-09-05 PROCEDURE — 83735 ASSAY OF MAGNESIUM: CPT

## 2018-09-05 PROCEDURE — 2700000000 HC OXYGEN THERAPY PER DAY

## 2018-09-05 PROCEDURE — 94760 N-INVAS EAR/PLS OXIMETRY 1: CPT

## 2018-09-05 PROCEDURE — 80048 BASIC METABOLIC PNL TOTAL CA: CPT

## 2018-09-05 PROCEDURE — 0SRS0JA REPLACEMENT OF LEFT HIP JOINT, FEMORAL SURFACE WITH SYNTHETIC SUBSTITUTE, UNCEMENTED, OPEN APPROACH: ICD-10-PCS | Performed by: ORTHOPAEDIC SURGERY

## 2018-09-05 PROCEDURE — 85027 COMPLETE CBC AUTOMATED: CPT

## 2018-09-05 PROCEDURE — 2709999900 HC NON-CHARGEABLE SUPPLY: Performed by: ORTHOPAEDIC SURGERY

## 2018-09-05 PROCEDURE — 6360000002 HC RX W HCPCS: Performed by: ANESTHESIOLOGY

## 2018-09-05 PROCEDURE — 6370000000 HC RX 637 (ALT 250 FOR IP)

## 2018-09-05 PROCEDURE — 6360000002 HC RX W HCPCS: Performed by: ORTHOPAEDIC SURGERY

## 2018-09-05 PROCEDURE — 3700000001 HC ADD 15 MINUTES (ANESTHESIA): Performed by: ORTHOPAEDIC SURGERY

## 2018-09-05 PROCEDURE — 88305 TISSUE EXAM BY PATHOLOGIST: CPT

## 2018-09-05 PROCEDURE — 82306 VITAMIN D 25 HYDROXY: CPT

## 2018-09-05 PROCEDURE — 1200000000 HC SEMI PRIVATE

## 2018-09-05 PROCEDURE — 3600000015 HC SURGERY LEVEL 5 ADDTL 15MIN: Performed by: ORTHOPAEDIC SURGERY

## 2018-09-05 PROCEDURE — 6360000002 HC RX W HCPCS: Performed by: NURSE PRACTITIONER

## 2018-09-05 PROCEDURE — C1776 JOINT DEVICE (IMPLANTABLE): HCPCS | Performed by: ORTHOPAEDIC SURGERY

## 2018-09-05 PROCEDURE — 3700000000 HC ANESTHESIA ATTENDED CARE: Performed by: ORTHOPAEDIC SURGERY

## 2018-09-05 PROCEDURE — 6360000002 HC RX W HCPCS: Performed by: HOSPITALIST

## 2018-09-05 PROCEDURE — 36415 COLL VENOUS BLD VENIPUNCTURE: CPT

## 2018-09-05 PROCEDURE — 7100000000 HC PACU RECOVERY - FIRST 15 MIN: Performed by: ORTHOPAEDIC SURGERY

## 2018-09-05 DEVICE — SYNERGY POROUS COATED FEMORAL SIZE 12
Type: IMPLANTABLE DEVICE | Site: HIP | Status: FUNCTIONAL
Brand: SYNERGY

## 2018-09-05 DEVICE — TANDEM BIPOLAR COBALT CHROME 49MM                                    OUTER DIAMETER 28MM INNER DIAMETER
Type: IMPLANTABLE DEVICE | Site: HIP | Status: FUNCTIONAL
Brand: TANDEM

## 2018-09-05 DEVICE — COBALT CHROME 12/14 TAPER FEMORAL                                    HEAD 28MM - 3: Type: IMPLANTABLE DEVICE | Site: HIP | Status: FUNCTIONAL

## 2018-09-05 RX ORDER — METHYLPREDNISOLONE SODIUM SUCCINATE 125 MG/2ML
INJECTION, POWDER, LYOPHILIZED, FOR SOLUTION INTRAMUSCULAR; INTRAVENOUS
Status: COMPLETED
Start: 2018-09-05 | End: 2018-09-05

## 2018-09-05 RX ORDER — IPRATROPIUM BROMIDE AND ALBUTEROL SULFATE 2.5; .5 MG/3ML; MG/3ML
SOLUTION RESPIRATORY (INHALATION)
Status: COMPLETED
Start: 2018-09-05 | End: 2018-09-05

## 2018-09-05 RX ORDER — FENTANYL CITRATE 50 UG/ML
INJECTION, SOLUTION INTRAMUSCULAR; INTRAVENOUS PRN
Status: DISCONTINUED | OUTPATIENT
Start: 2018-09-05 | End: 2018-09-05 | Stop reason: SDUPTHER

## 2018-09-05 RX ORDER — METHYLPREDNISOLONE SODIUM SUCCINATE 40 MG/ML
INJECTION, POWDER, LYOPHILIZED, FOR SOLUTION INTRAMUSCULAR; INTRAVENOUS
Status: DISPENSED
Start: 2018-09-05 | End: 2018-09-06

## 2018-09-05 RX ORDER — LORAZEPAM 2 MG/ML
0.5 INJECTION INTRAMUSCULAR ONCE
Status: COMPLETED | OUTPATIENT
Start: 2018-09-05 | End: 2018-09-05

## 2018-09-05 RX ORDER — LORAZEPAM 2 MG/ML
0.5 INJECTION INTRAMUSCULAR ONCE
Status: COMPLETED | OUTPATIENT
Start: 2018-09-06 | End: 2018-09-06

## 2018-09-05 RX ORDER — IPRATROPIUM BROMIDE AND ALBUTEROL SULFATE 2.5; .5 MG/3ML; MG/3ML
1 SOLUTION RESPIRATORY (INHALATION) ONCE
Status: COMPLETED | OUTPATIENT
Start: 2018-09-05 | End: 2018-09-05

## 2018-09-05 RX ORDER — CEFAZOLIN SODIUM 1 G/3ML
1 INJECTION, POWDER, FOR SOLUTION INTRAMUSCULAR; INTRAVENOUS EVERY 8 HOURS
Status: DISCONTINUED | OUTPATIENT
Start: 2018-09-06 | End: 2018-09-05

## 2018-09-05 RX ORDER — ROCURONIUM BROMIDE 10 MG/ML
INJECTION, SOLUTION INTRAVENOUS PRN
Status: DISCONTINUED | OUTPATIENT
Start: 2018-09-05 | End: 2018-09-05 | Stop reason: SDUPTHER

## 2018-09-05 RX ORDER — PROPOFOL 10 MG/ML
INJECTION, EMULSION INTRAVENOUS PRN
Status: DISCONTINUED | OUTPATIENT
Start: 2018-09-05 | End: 2018-09-05 | Stop reason: SDUPTHER

## 2018-09-05 RX ORDER — DEXAMETHASONE SODIUM PHOSPHATE 10 MG/ML
INJECTION INTRAMUSCULAR; INTRAVENOUS PRN
Status: DISCONTINUED | OUTPATIENT
Start: 2018-09-05 | End: 2018-09-05 | Stop reason: SDUPTHER

## 2018-09-05 RX ORDER — METHYLPREDNISOLONE SODIUM SUCCINATE 125 MG/2ML
125 INJECTION, POWDER, LYOPHILIZED, FOR SOLUTION INTRAMUSCULAR; INTRAVENOUS ONCE
Status: COMPLETED | OUTPATIENT
Start: 2018-09-05 | End: 2018-09-05

## 2018-09-05 RX ORDER — MAGNESIUM HYDROXIDE 1200 MG/15ML
LIQUID ORAL CONTINUOUS PRN
Status: COMPLETED | OUTPATIENT
Start: 2018-09-05 | End: 2018-09-05

## 2018-09-05 RX ADMIN — LORAZEPAM 0.5 MG: 2 INJECTION, SOLUTION INTRAMUSCULAR; INTRAVENOUS at 03:08

## 2018-09-05 RX ADMIN — IPRATROPIUM BROMIDE AND ALBUTEROL SULFATE 1 AMPULE: 2.5; .5 SOLUTION RESPIRATORY (INHALATION) at 21:52

## 2018-09-05 RX ADMIN — Medication: at 22:56

## 2018-09-05 RX ADMIN — Medication 2 G: at 20:35

## 2018-09-05 RX ADMIN — MORPHINE SULFATE 2 MG: 2 INJECTION, SOLUTION INTRAMUSCULAR; INTRAVENOUS at 17:28

## 2018-09-05 RX ADMIN — SODIUM CHLORIDE: 9 INJECTION, SOLUTION INTRAVENOUS at 01:36

## 2018-09-05 RX ADMIN — SUGAMMADEX 200 MG: 100 INJECTION, SOLUTION INTRAVENOUS at 21:22

## 2018-09-05 RX ADMIN — ONDANSETRON 4 MG: 2 INJECTION INTRAMUSCULAR; INTRAVENOUS at 20:43

## 2018-09-05 RX ADMIN — MORPHINE SULFATE 2 MG: 2 INJECTION, SOLUTION INTRAMUSCULAR; INTRAVENOUS at 11:19

## 2018-09-05 RX ADMIN — METHYLPREDNISOLONE SODIUM SUCCINATE 125 MG: 125 INJECTION, POWDER, LYOPHILIZED, FOR SOLUTION INTRAMUSCULAR; INTRAVENOUS at 22:03

## 2018-09-05 RX ADMIN — DEXAMETHASONE SODIUM PHOSPHATE 8 MG: 10 INJECTION INTRAMUSCULAR; INTRAVENOUS at 20:39

## 2018-09-05 RX ADMIN — ROCURONIUM BROMIDE 40 MG: 10 SOLUTION INTRAVENOUS at 20:21

## 2018-09-05 RX ADMIN — FENTANYL CITRATE 100 MCG: 50 INJECTION, SOLUTION INTRAMUSCULAR; INTRAVENOUS at 20:20

## 2018-09-05 RX ADMIN — IPRATROPIUM BROMIDE AND ALBUTEROL SULFATE 1 AMPULE: .5; 3 SOLUTION RESPIRATORY (INHALATION) at 21:52

## 2018-09-05 RX ADMIN — MORPHINE SULFATE 2 MG: 2 INJECTION, SOLUTION INTRAMUSCULAR; INTRAVENOUS at 06:32

## 2018-09-05 RX ADMIN — METHYLPREDNISOLONE SODIUM SUCCINATE 125 MG: 125 INJECTION, POWDER, FOR SOLUTION INTRAMUSCULAR; INTRAVENOUS at 22:03

## 2018-09-05 RX ADMIN — PROPOFOL 150 MG: 10 INJECTION, EMULSION INTRAVENOUS at 20:20

## 2018-09-05 ASSESSMENT — PAIN DESCRIPTION - PAIN TYPE
TYPE: SURGICAL PAIN
TYPE: ACUTE PAIN
TYPE: ACUTE PAIN

## 2018-09-05 ASSESSMENT — PULMONARY FUNCTION TESTS
PIF_VALUE: 18
PIF_VALUE: 21
PIF_VALUE: 20
PIF_VALUE: 18
PIF_VALUE: 19
PIF_VALUE: 20
PIF_VALUE: 18
PIF_VALUE: 1
PIF_VALUE: 17
PIF_VALUE: 18
PIF_VALUE: 3
PIF_VALUE: 17
PIF_VALUE: 16
PIF_VALUE: 5
PIF_VALUE: 27
PIF_VALUE: 18
PIF_VALUE: 17
PIF_VALUE: 19
PIF_VALUE: 18
PIF_VALUE: 19
PIF_VALUE: 20
PIF_VALUE: 18
PIF_VALUE: 21
PIF_VALUE: 18
PIF_VALUE: 22
PIF_VALUE: 1
PIF_VALUE: 5
PIF_VALUE: 18
PIF_VALUE: 2
PIF_VALUE: 16
PIF_VALUE: 18
PIF_VALUE: 3
PIF_VALUE: 21
PIF_VALUE: 19
PIF_VALUE: 16
PIF_VALUE: 22
PIF_VALUE: 2
PIF_VALUE: 17
PIF_VALUE: 17
PIF_VALUE: 21
PIF_VALUE: 0
PIF_VALUE: 15
PIF_VALUE: 16
PIF_VALUE: 18
PIF_VALUE: 18
PIF_VALUE: 2
PIF_VALUE: 1
PIF_VALUE: 19
PIF_VALUE: 19
PIF_VALUE: 18
PIF_VALUE: 1
PIF_VALUE: 19
PIF_VALUE: 16
PIF_VALUE: 24
PIF_VALUE: 18
PIF_VALUE: 17
PIF_VALUE: 17
PIF_VALUE: 26
PIF_VALUE: 2
PIF_VALUE: 5
PIF_VALUE: 0
PIF_VALUE: 19
PIF_VALUE: 2
PIF_VALUE: 1
PIF_VALUE: 20
PIF_VALUE: 19
PIF_VALUE: 24
PIF_VALUE: 19
PIF_VALUE: 19
PIF_VALUE: 18
PIF_VALUE: 23
PIF_VALUE: 2

## 2018-09-05 ASSESSMENT — PAIN DESCRIPTION - ORIENTATION
ORIENTATION: LEFT

## 2018-09-05 ASSESSMENT — PAIN DESCRIPTION - LOCATION
LOCATION: LEG
LOCATION: HIP
LOCATION: LEG

## 2018-09-05 ASSESSMENT — PAIN SCALES - GENERAL
PAINLEVEL_OUTOF10: 8
PAINLEVEL_OUTOF10: 8
PAINLEVEL_OUTOF10: 9
PAINLEVEL_OUTOF10: 2

## 2018-09-05 ASSESSMENT — PAIN DESCRIPTION - FREQUENCY: FREQUENCY: CONTINUOUS

## 2018-09-05 ASSESSMENT — PAIN DESCRIPTION - ONSET: ONSET: ON-GOING

## 2018-09-05 ASSESSMENT — PAIN DESCRIPTION - DESCRIPTORS: DESCRIPTORS: CONSTANT

## 2018-09-05 NOTE — ANESTHESIA PRE PROCEDURE
status: Never Smoker    Smokeless tobacco: Never Used    Alcohol use No                                Counseling given: Not Answered      Vital Signs (Current):   Vitals:    09/04/18 1451 09/04/18 1633 09/04/18 2300 09/05/18 0755   BP: (!) 148/87 (!) 145/77 126/76 134/83   Pulse: 92 120 90 92   Resp: 20 20 16 16   Temp: 98 °F (36.7 °C) 98.6 °F (37 °C) 98 °F (36.7 °C) 99.4 °F (37.4 °C)   TempSrc:  Oral Oral Oral   SpO2: 96% 92% 97% 92%   Weight:       Height:                                                  BP Readings from Last 3 Encounters:   09/05/18 134/83   08/16/18 130/80   08/09/18 112/68       NPO Status:                                                                                 BMI:   Wt Readings from Last 3 Encounters:   09/04/18 120 lb (54.4 kg)   08/16/18 123 lb (55.8 kg)   08/09/18 112 lb 9.6 oz (51.1 kg)     Body mass index is 17.22 kg/m². CBC:   Lab Results   Component Value Date    WBC 7.9 09/05/2018    RBC 3.91 09/05/2018    HGB 12.1 09/05/2018    HCT 36.4 09/05/2018    MCV 93.1 09/05/2018    RDW 14.6 09/05/2018     09/05/2018       CMP:   Lab Results   Component Value Date     09/05/2018    K 4.4 09/05/2018     09/05/2018    CO2 28 09/05/2018    BUN 20 09/05/2018    CREATININE 0.8 09/05/2018    GFRAA >60 09/05/2018    AGRATIO 1.2 09/04/2018    LABGLOM >60 09/05/2018    GLUCOSE 115 09/05/2018    PROT 6.6 09/04/2018    CALCIUM 8.5 09/05/2018    BILITOT 0.3 09/04/2018    ALKPHOS 79 09/04/2018    AST 19 09/04/2018    ALT 14 09/04/2018       POC Tests: No results for input(s): POCGLU, POCNA, POCK, POCCL, POCBUN, POCHEMO, POCHCT in the last 72 hours.     Coags: No results found for: PROTIME, INR, APTT    HCG (If Applicable): No results found for: PREGTESTUR, PREGSERUM, HCG, HCGQUANT     ABGs: No results found for: PHART, PO2ART, GJC1RCY, APS2XFJ, BEART, Y1UUITPC     Type & Screen (If Applicable):  No results found for: CHRISTOPHER [facility]    Anesthesia Evaluation  Patient summary Plan      general     ASA 3     (I discussed with the patient and guardian bakari Blancas the risks and benefits of PIV, general anesthesia, IV Narcotics, PACU. All questions were answered the patient and guardian agrees with the plan    D/w patient and guardian benefits/risks of nerve block for post op pain relief. Patient and guardian and  surgeon agreeable to anesthetic plan. )  Induction: intravenous. Anesthetic plan and risks discussed with patient, legal guardian, healthcare power of  and child/children. Plan discussed with CRNA.                 Milli Romero MD   9/5/2018

## 2018-09-05 NOTE — PROGRESS NOTES
Hospitalist Progress Note      PCP: Tanesha Wells MD    Date of Admission: 9/4/2018    Chief Complaint: Fall, leg pain    Hospital Course: 80 y.o. male who presented to [facility] with accidental fall and subsequent L hip pain. Hx obtained from pt and family given pt with advanced dementia. Hx of falls, per chart review, had a couple falls recently at home req short rehab stay. Fell walking to bathroom, slipped, twisted and fell onto L side. No LOC. Hx of HTN, OA, Alzheimer's dementia.     Was admitted to [facility] with left femur fracture      Subjective: No acute issues since admission. Patient has complaints of left lower extremity pain currently awaiting OR      Medications:  Reviewed    Infusion Medications    sodium chloride 50 mL/hr at 09/05/18 0136     Scheduled Medications    amLODIPine  2.5 mg Oral Daily    donepezil  5 mg Oral Nightly    mirtazapine  15 mg Oral Nightly    sodium chloride flush  10 mL Intravenous 2 times per day    enoxaparin  40 mg Subcutaneous Daily    acetaminophen  1,000 mg Oral TID    sennosides-docusate sodium  2 tablet Oral BID    ceFAZolin  2 g Intravenous On Call to OR     PRN Meds: sodium chloride flush, magnesium hydroxide, ondansetron, morphine, oxyCODONE      Intake/Output Summary (Last 24 hours) at 09/05/18 1140  Last data filed at 09/05/18 0631   Gross per 24 hour   Intake                0 ml   Output              750 ml   Net             -750 ml       Physical Exam Performed:    /83   Pulse 92   Temp 99.4 °F (37.4 °C) (Oral)   Resp 16   Ht 5' 10\" (1.778 m)   Wt 120 lb (54.4 kg)   SpO2 92%   BMI 17.22 kg/m²   General appearance:   appears to be in some amount of pain  HEENT:  Normal cephalic, atraumatic without obvious deformity. Pupils equal, round, and reactive to light. Extra ocular muscles intact. Conjunctivae/corneas clear. Neck: Supple, with full range of motion. No jugular venous distention. Trachea midline.   Respiratory:  Normal respiratory effort. Clear to auscultation, bilaterally without Rales/Wheezes/Rhonchi. Cardiovascular:  Regular rate and rhythm with normal S1/S2 without murmurs, rubs or gallops. Abdomen: Soft, non-tender, non-distended with normal bowel sounds. Musculoskeletal:  No clubbing, cyanosis or edema bilaterally. Left lower extremity in Urbina's traction  Skin:  pale warm and dry. No rashes or lesions. Neurologic:  Neurovascularly intact without any focal sensory/motor deficits. Psychiatric:  defer  Capillary Refill: Brisk,< 3 seconds   Peripheral Pulses: +2 palpable, equal bilaterally     Labs:   Recent Labs      09/04/18   1312  09/05/18   0554   WBC  6.9  7.9   HGB  13.3*  12.1*   HCT  40.1*  36.4*   PLT  227  196     Recent Labs      09/04/18   1312  09/05/18   0554   NA  142  140   K  4.9  4.4   CL  104  104   CO2  26  28   BUN  17  20   CREATININE  0.7*  0.8   CALCIUM  9.0  8.5     Recent Labs      09/04/18   1312   AST  19   ALT  14   BILITOT  0.3   ALKPHOS  79     No results for input(s): INR in the last 72 hours. No results for input(s): Crawford Reasoner in the last 72 hours. Urinalysis:    No results found for: Valora Sole, BACTERIA, RBCUA, BLOODU, SPECGRAV, Diya [place]    Radiology:  XR HIP LEFT (2-3 VIEWS)   Final Result   Left femoral neck fracture is redemonstrated. No significant change in   configuration is identified on the current examination. CT CERVICAL SPINE WO CONTRAST   Final Result   No acute abnormality of the cervical spine. Moderate multilevel degenerative changes. CT HEAD WO CONTRAST   Final Result   No acute intracranial abnormality. Fluid level in the right maxillary sinus could represent sinusitis. XR HIP LEFT (2-3 VIEWS)   Final Result   1. Foreshortening of the left femoral neck is suspicious for an acute   fracture.                  Assessment/Plan:    Active Hospital Problems    Diagnosis Date Noted    Cl[facility] femur ([facility] Rupert Beams 09/04/2018    Late onset Alzheimer's disease with behavioral disturbance [G30.1, F02.81] 08/13/2018    OA (osteoarthritis) [M19.90] 08/13/2018    Essential hypertension [I10] 08/09/2018     Closed fracture of the neck of the left femur secondary to mechanical fall in the setting of late onset Alzheimer's patient with behavioral disturbances  - ortho consulted plans for surgery this pm   - PT/OT and weight bearing defer to Ortho service   - DVT PPX  - monitor labs     HTN- controlled  - cont home meds     Dementia   - supportive care and safety         DVT Prophylaxis: Lovenox   Diet: Diet NPO, After Midnight  Code Status: Full Code    PT/OT Eval Status: tp follow   CM for discharge assistance     Dispo - pending recovery from surgery, likely return to facility     KATIE Peña - CNP

## 2018-09-05 NOTE — PROGRESS NOTES
Pt pulling at nasal cannula, IV, taking off gown and moving out of brand's traction. Cross cover paged. New orders received. Will continue to monitor.

## 2018-09-05 NOTE — CONSULTS
daily Apply topically daily. Yes Historical Provider, MD   sulfacetamide (BLEPH-10) 10 % ophthalmic ointment every 6 hours Every 6 hours. Yes Historical Provider, MD       Allergies:  Claritin [loratadine]    Social History:    Tobacco:  reports that he has never smoked. He has never used smokeless tobacco.   Alcohol:  reports that he does not drink alcohol. Illicit Drug: No  Family History:   Family History   Problem Relation Age of Onset    Family history unknown: Yes       REVIEW OF SYSTEMS:    CONSTITUTIONAL:  negative  MUSCULOSKELETAL:  positive for  pain    PHYSICAL EXAM:    awake, alert, cooperative, no apparent distress, and appears stated age  MUSCULOSKELETAL:  Short and externally rotated left lower extremity. Positive logroll. Neurovascularly intact no significant skin disruption    DATA:    CBC:   Recent Labs      09/04/18   1312  09/05/18   0554   WBC  6.9  7.9   HGB  13.3*  12.1*   PLT  227  196     BMP:    Recent Labs      09/04/18   1312  09/05/18   0554   NA  142  140   K  4.9  4.4   CL  104  104   CO2  26  28   BUN  17  20   CREATININE  0.7*  0.8   GLUCOSE  103*  115*     INR: No results for input(s): INR in the last 72 hours. Radiology:   XR HIP LEFT (2-3 VIEWS)   Final Result   Left femoral neck fracture is redemonstrated. No significant change in   configuration is identified on the current examination. CT CERVICAL SPINE WO CONTRAST   Final Result   No acute abnormality of the cervical spine. Moderate multilevel degenerative changes. CT HEAD WO CONTRAST   Final Result   No acute intracranial abnormality. Fluid level in the right maxillary sinus could represent sinusitis. XR HIP LEFT (2-3 VIEWS)   Final Result   1. Foreshortening of the left femoral neck is suspicious for an acute   fracture.                 IMPRESSION/RECOMMENDATIONS:    Assessment: Displaced left femoral neck fracture    Plan:  1) to the operating room for left hip press-fit bipolar

## 2018-09-06 ENCOUNTER — APPOINTMENT (OUTPATIENT)
Dept: GENERAL RADIOLOGY | Age: 83
DRG: 469 | End: 2018-09-06
Payer: MEDICARE

## 2018-09-06 PROBLEM — I10 ESSENTIAL HYPERTENSION: Chronic | Status: ACTIVE | Noted: 2018-08-09

## 2018-09-06 PROBLEM — Z71.89 ADVANCED DIRECTIVES, COUNSELING/DISCUSSION: Status: RESOLVED | Noted: 2018-08-09 | Resolved: 2018-09-06

## 2018-09-06 PROBLEM — Z00.00 HEALTHCARE MAINTENANCE: Status: RESOLVED | Noted: 2018-08-09 | Resolved: 2018-09-06

## 2018-09-06 PROBLEM — G30.1 LATE ONSET ALZHEIMER'S DISEASE WITH BEHAVIORAL DISTURBANCE (HCC): Chronic | Status: ACTIVE | Noted: 2018-08-13

## 2018-09-06 PROBLEM — M19.90 OA (OSTEOARTHRITIS): Chronic | Status: ACTIVE | Noted: 2018-08-13

## 2018-09-06 PROBLEM — R23.3 EASY BRUISING: Status: RESOLVED | Noted: 2018-08-09 | Resolved: 2018-09-06

## 2018-09-06 PROBLEM — J96.01 ACUTE HYPOXEMIC RESPIRATORY FAILURE (HCC): Status: ACTIVE | Noted: 2018-09-06

## 2018-09-06 PROBLEM — F02.818 LATE ONSET ALZHEIMER'S DISEASE WITH BEHAVIORAL DISTURBANCE (HCC): Chronic | Status: ACTIVE | Noted: 2018-08-13

## 2018-09-06 PROBLEM — R54 AGE-RELATED PHYSICAL DEBILITY: Chronic | Status: ACTIVE | Noted: 2018-08-09

## 2018-09-06 PROBLEM — E55.9 HYPOVITAMINOSIS D: Status: ACTIVE | Noted: 2018-09-06

## 2018-09-06 LAB
ANION GAP SERPL CALCULATED.3IONS-SCNC: 11 MMOL/L (ref 3–16)
APPEARANCE BAL (LAVAGE): ABNORMAL
BASE EXCESS ARTERIAL: -1.9 MMOL/L (ref -3–3)
BUN BLDV-MCNC: 19 MG/DL (ref 7–20)
CALCIUM SERPL-MCNC: 8.1 MG/DL (ref 8.3–10.6)
CARBOXYHEMOGLOBIN ARTERIAL: 0.7 % (ref 0–1.5)
CHLORIDE BLD-SCNC: 103 MMOL/L (ref 99–110)
CLOT EVALUATION BAL: ABNORMAL
CO2: 25 MMOL/L (ref 21–32)
COLOR LAVAGE: COLORLESS
CREAT SERPL-MCNC: 0.8 MG/DL (ref 0.8–1.3)
EPITHELIAL CELLS FLUID: 40 %
GFR AFRICAN AMERICAN: >60
GFR NON-AFRICAN AMERICAN: >60
GLUCOSE BLD-MCNC: 211 MG/DL (ref 70–99)
HCO3 ARTERIAL: 22 MMOL/L (ref 21–29)
HCT VFR BLD CALC: 31.2 % (ref 40.5–52.5)
HEMOGLOBIN, ART, EXTENDED: 11.9 G/DL (ref 13.5–17.5)
HEMOGLOBIN: 10.6 G/DL (ref 13.5–17.5)
LYMPHOCYTES, BAL: 3 % (ref 5–10)
MCH RBC QN AUTO: 31.2 PG (ref 26–34)
MCHC RBC AUTO-ENTMCNC: 33.9 G/DL (ref 31–36)
MCV RBC AUTO: 92.1 FL (ref 80–100)
METHEMOGLOBIN ARTERIAL: 0.8 %
MONONUCLEAR UNIDENTIFIED CELLS FLUID BAL: 1 %
NUMBER OF CELLS COUNTED BAL (LAVAGE): 200
O2 CONTENT ARTERIAL: 16 ML/DL
O2 SAT, ARTERIAL: 96.4 %
O2 THERAPY: ABNORMAL
PATH REVIEW BAL (LAVAGE): YES
PCO2 ARTERIAL: 34.8 MMHG (ref 35–45)
PDW BLD-RTO: 14.3 % (ref 12.4–15.4)
PH ARTERIAL: 7.42 (ref 7.35–7.45)
PLATELET # BLD: 160 K/UL (ref 135–450)
PMV BLD AUTO: 8.1 FL (ref 5–10.5)
PO2 ARTERIAL: 88.8 MMHG (ref 75–108)
POTASSIUM SERPL-SCNC: 4.2 MMOL/L (ref 3.5–5.1)
RBC # BLD: 3.38 M/UL (ref 4.2–5.9)
RBC, BAL: 889 /CUMM
SEGMENTED NEUTROPHILS, BAL: 56 % (ref 5–10)
SODIUM BLD-SCNC: 139 MMOL/L (ref 136–145)
TCO2 ARTERIAL: 23.1 MMOL/L
VANCOMYCIN RANDOM: 6.5 UG/ML
WBC # BLD: 7.7 K/UL (ref 4–11)
WBC/EPI CELLS BAL: 133 /CUMM

## 2018-09-06 PROCEDURE — 88112 CYTOPATH CELL ENHANCE TECH: CPT

## 2018-09-06 PROCEDURE — 6360000002 HC RX W HCPCS: Performed by: INTERNAL MEDICINE

## 2018-09-06 PROCEDURE — 6360000002 HC RX W HCPCS: Performed by: HOSPITALIST

## 2018-09-06 PROCEDURE — 6360000002 HC RX W HCPCS: Performed by: NURSE PRACTITIONER

## 2018-09-06 PROCEDURE — 6370000000 HC RX 637 (ALT 250 FOR IP): Performed by: HOSPITALIST

## 2018-09-06 PROCEDURE — 73501 X-RAY EXAM HIP UNI 1 VIEW: CPT

## 2018-09-06 PROCEDURE — 99152 MOD SED SAME PHYS/QHP 5/>YRS: CPT | Performed by: INTERNAL MEDICINE

## 2018-09-06 PROCEDURE — 0B9D8ZX DRAINAGE OF RIGHT MIDDLE LUNG LOBE, VIA NATURAL OR ARTIFICIAL OPENING ENDOSCOPIC, DIAGNOSTIC: ICD-10-PCS | Performed by: INTERNAL MEDICINE

## 2018-09-06 PROCEDURE — 2060000000 HC ICU INTERMEDIATE R&B

## 2018-09-06 PROCEDURE — 89051 BODY FLUID CELL COUNT: CPT

## 2018-09-06 PROCEDURE — 87641 MR-STAPH DNA AMP PROBE: CPT

## 2018-09-06 PROCEDURE — 85027 COMPLETE CBC AUTOMATED: CPT

## 2018-09-06 PROCEDURE — 2709999900 HC NON-CHARGEABLE SUPPLY: Performed by: INTERNAL MEDICINE

## 2018-09-06 PROCEDURE — 94640 AIRWAY INHALATION TREATMENT: CPT

## 2018-09-06 PROCEDURE — 87205 SMEAR GRAM STAIN: CPT

## 2018-09-06 PROCEDURE — 2580000003 HC RX 258: Performed by: HOSPITALIST

## 2018-09-06 PROCEDURE — 82803 BLOOD GASES ANY COMBINATION: CPT

## 2018-09-06 PROCEDURE — 80202 ASSAY OF VANCOMYCIN: CPT

## 2018-09-06 PROCEDURE — 87070 CULTURE OTHR SPECIMN AEROBIC: CPT

## 2018-09-06 PROCEDURE — 71045 X-RAY EXAM CHEST 1 VIEW: CPT

## 2018-09-06 PROCEDURE — 31624 DX BRONCHOSCOPE/LAVAGE: CPT | Performed by: INTERNAL MEDICINE

## 2018-09-06 PROCEDURE — 3609027000 HC BRONCHOSCOPY: Performed by: INTERNAL MEDICINE

## 2018-09-06 PROCEDURE — 99223 1ST HOSP IP/OBS HIGH 75: CPT | Performed by: INTERNAL MEDICINE

## 2018-09-06 PROCEDURE — 31645 BRNCHSC W/THER ASPIR 1ST: CPT | Performed by: INTERNAL MEDICINE

## 2018-09-06 PROCEDURE — 36415 COLL VENOUS BLD VENIPUNCTURE: CPT

## 2018-09-06 PROCEDURE — 87206 SMEAR FLUORESCENT/ACID STAI: CPT

## 2018-09-06 PROCEDURE — 88305 TISSUE EXAM BY PATHOLOGIST: CPT

## 2018-09-06 PROCEDURE — 87015 SPECIMEN INFECT AGNT CONCNTJ: CPT

## 2018-09-06 PROCEDURE — 87116 MYCOBACTERIA CULTURE: CPT

## 2018-09-06 PROCEDURE — 36600 WITHDRAWAL OF ARTERIAL BLOOD: CPT

## 2018-09-06 PROCEDURE — 2580000003 HC RX 258: Performed by: ORTHOPAEDIC SURGERY

## 2018-09-06 PROCEDURE — 94664 DEMO&/EVAL PT USE INHALER: CPT

## 2018-09-06 PROCEDURE — 94761 N-INVAS EAR/PLS OXIMETRY MLT: CPT

## 2018-09-06 PROCEDURE — 80048 BASIC METABOLIC PNL TOTAL CA: CPT

## 2018-09-06 PROCEDURE — 2500000003 HC RX 250 WO HCPCS: Performed by: INTERNAL MEDICINE

## 2018-09-06 PROCEDURE — 6360000002 HC RX W HCPCS: Performed by: ORTHOPAEDIC SURGERY

## 2018-09-06 PROCEDURE — 88312 SPECIAL STAINS GROUP 1: CPT

## 2018-09-06 PROCEDURE — 2580000003 HC RX 258: Performed by: INTERNAL MEDICINE

## 2018-09-06 PROCEDURE — 2700000000 HC OXYGEN THERAPY PER DAY

## 2018-09-06 PROCEDURE — 87102 FUNGUS ISOLATION CULTURE: CPT

## 2018-09-06 RX ORDER — MORPHINE SULFATE 2 MG/ML
2 INJECTION, SOLUTION INTRAMUSCULAR; INTRAVENOUS
Status: DISCONTINUED | OUTPATIENT
Start: 2018-09-06 | End: 2018-09-11 | Stop reason: HOSPADM

## 2018-09-06 RX ORDER — 0.9 % SODIUM CHLORIDE 0.9 %
INTRAVENOUS SOLUTION INTRAVENOUS CONTINUOUS PRN
Status: COMPLETED | OUTPATIENT
Start: 2018-09-06 | End: 2018-09-06

## 2018-09-06 RX ORDER — LORAZEPAM 2 MG/ML
0.5 INJECTION INTRAMUSCULAR ONCE
Status: COMPLETED | OUTPATIENT
Start: 2018-09-06 | End: 2018-09-06

## 2018-09-06 RX ORDER — DIAZEPAM 5 MG/ML
2.5 INJECTION, SOLUTION INTRAMUSCULAR; INTRAVENOUS ONCE
Status: DISCONTINUED | OUTPATIENT
Start: 2018-09-06 | End: 2018-09-06 | Stop reason: CLARIF

## 2018-09-06 RX ORDER — ALBUTEROL SULFATE 2.5 MG/3ML
2.5 SOLUTION RESPIRATORY (INHALATION) EVERY 6 HOURS PRN
Status: DISCONTINUED | OUTPATIENT
Start: 2018-09-06 | End: 2018-09-11 | Stop reason: HOSPADM

## 2018-09-06 RX ORDER — NALOXONE HYDROCHLORIDE 0.4 MG/ML
INJECTION, SOLUTION INTRAMUSCULAR; INTRAVENOUS; SUBCUTANEOUS
Status: DISPENSED
Start: 2018-09-06 | End: 2018-09-06

## 2018-09-06 RX ORDER — MIDAZOLAM HYDROCHLORIDE 5 MG/ML
INJECTION INTRAMUSCULAR; INTRAVENOUS PRN
Status: DISCONTINUED | OUTPATIENT
Start: 2018-09-06 | End: 2018-09-06 | Stop reason: HOSPADM

## 2018-09-06 RX ORDER — NALOXONE HYDROCHLORIDE 1 MG/ML
1 INJECTION INTRAMUSCULAR; INTRAVENOUS; SUBCUTANEOUS ONCE
Status: COMPLETED | OUTPATIENT
Start: 2018-09-06 | End: 2018-09-06

## 2018-09-06 RX ORDER — LORAZEPAM 2 MG/ML
INJECTION INTRAMUSCULAR
Status: DISPENSED
Start: 2018-09-06 | End: 2018-09-06

## 2018-09-06 RX ORDER — METHYLPREDNISOLONE SODIUM SUCCINATE 40 MG/ML
40 INJECTION, POWDER, LYOPHILIZED, FOR SOLUTION INTRAMUSCULAR; INTRAVENOUS EVERY 12 HOURS
Status: DISCONTINUED | OUTPATIENT
Start: 2018-09-06 | End: 2018-09-09

## 2018-09-06 RX ORDER — LIDOCAINE HYDROCHLORIDE 20 MG/ML
INJECTION, SOLUTION INFILTRATION; PERINEURAL PRN
Status: DISCONTINUED | OUTPATIENT
Start: 2018-09-06 | End: 2018-09-06 | Stop reason: HOSPADM

## 2018-09-06 RX ORDER — IPRATROPIUM BROMIDE AND ALBUTEROL SULFATE 2.5; .5 MG/3ML; MG/3ML
1 SOLUTION RESPIRATORY (INHALATION) EVERY 4 HOURS
Status: DISCONTINUED | OUTPATIENT
Start: 2018-09-06 | End: 2018-09-06 | Stop reason: HOSPADM

## 2018-09-06 RX ADMIN — MIRTAZAPINE 15 MG: 15 TABLET, FILM COATED ORAL at 20:24

## 2018-09-06 RX ADMIN — SODIUM CHLORIDE, PRESERVATIVE FREE 10 ML: 5 INJECTION INTRAVENOUS at 20:25

## 2018-09-06 RX ADMIN — IPRATROPIUM BROMIDE AND ALBUTEROL SULFATE 1 AMPULE: .5; 3 SOLUTION RESPIRATORY (INHALATION) at 11:35

## 2018-09-06 RX ADMIN — MORPHINE SULFATE 2 MG: 2 INJECTION, SOLUTION INTRAMUSCULAR; INTRAVENOUS at 13:42

## 2018-09-06 RX ADMIN — OXYCODONE HYDROCHLORIDE 5 MG: 5 TABLET ORAL at 16:47

## 2018-09-06 RX ADMIN — SODIUM CHLORIDE: 9 INJECTION, SOLUTION INTRAVENOUS at 21:44

## 2018-09-06 RX ADMIN — LORAZEPAM 0.5 MG: 2 INJECTION, SOLUTION INTRAMUSCULAR; INTRAVENOUS at 15:23

## 2018-09-06 RX ADMIN — METHYLPREDNISOLONE SODIUM SUCCINATE 40 MG: 40 INJECTION, POWDER, LYOPHILIZED, FOR SOLUTION INTRAMUSCULAR; INTRAVENOUS at 08:22

## 2018-09-06 RX ADMIN — SODIUM CHLORIDE, PRESERVATIVE FREE 10 ML: 5 INJECTION INTRAVENOUS at 03:03

## 2018-09-06 RX ADMIN — METHYLPREDNISOLONE SODIUM SUCCINATE 40 MG: 40 INJECTION, POWDER, LYOPHILIZED, FOR SOLUTION INTRAMUSCULAR; INTRAVENOUS at 21:41

## 2018-09-06 RX ADMIN — LORAZEPAM 0.5 MG: 2 INJECTION, SOLUTION INTRAMUSCULAR; INTRAVENOUS at 00:08

## 2018-09-06 RX ADMIN — PIPERACILLIN AND TAZOBACTAM 3.38 G: 3; .375 INJECTION, POWDER, FOR SOLUTION INTRAVENOUS at 14:30

## 2018-09-06 RX ADMIN — PIPERACILLIN AND TAZOBACTAM 3.38 G: 3; .375 INJECTION, POWDER, FOR SOLUTION INTRAVENOUS at 21:40

## 2018-09-06 RX ADMIN — MORPHINE SULFATE 2 MG: 2 INJECTION, SOLUTION INTRAMUSCULAR; INTRAVENOUS at 22:35

## 2018-09-06 RX ADMIN — DEXTROSE MONOHYDRATE 1 G: 5 INJECTION INTRAVENOUS at 05:07

## 2018-09-06 RX ADMIN — PIPERACILLIN AND TAZOBACTAM 3.38 G: 3; .375 INJECTION, POWDER, FOR SOLUTION INTRAVENOUS at 07:34

## 2018-09-06 RX ADMIN — VANCOMYCIN HYDROCHLORIDE 1000 MG: 1 INJECTION, POWDER, LYOPHILIZED, FOR SOLUTION INTRAVENOUS at 06:13

## 2018-09-06 RX ADMIN — ACETAMINOPHEN 1000 MG: 500 TABLET ORAL at 20:24

## 2018-09-06 RX ADMIN — SENNOSIDES AND DOCUSATE SODIUM 2 TABLET: 8.6; 5 TABLET ORAL at 20:24

## 2018-09-06 RX ADMIN — MORPHINE SULFATE 2 MG: 2 INJECTION, SOLUTION INTRAMUSCULAR; INTRAVENOUS at 03:03

## 2018-09-06 RX ADMIN — DONEPEZIL HYDROCHLORIDE 5 MG: 5 TABLET, FILM COATED ORAL at 20:24

## 2018-09-06 RX ADMIN — MORPHINE SULFATE 2 MG: 2 INJECTION, SOLUTION INTRAMUSCULAR; INTRAVENOUS at 07:34

## 2018-09-06 RX ADMIN — NALOXONE HYDROCHLORIDE 1 MG: 1 INJECTION PARENTERAL at 06:11

## 2018-09-06 ASSESSMENT — PAIN SCALES - GENERAL
PAINLEVEL_OUTOF10: 6
PAINLEVEL_OUTOF10: 6
PAINLEVEL_OUTOF10: 4
PAINLEVEL_OUTOF10: 10
PAINLEVEL_OUTOF10: 10

## 2018-09-06 ASSESSMENT — PAIN SCALES - WONG BAKER
WONGBAKER_NUMERICALRESPONSE: 8
WONGBAKER_NUMERICALRESPONSE: 10
WONGBAKER_NUMERICALRESPONSE: 10

## 2018-09-06 NOTE — PROGRESS NOTES
Received pt from OR coughing and stridoring. Dr. Lomeli [place] here. Jaw thrust done. Pt suctioned for moderate amount of thick blood tinged mucous. Duoneb treatment done. Pt continues to cough. Will continue to suction and monitor respiratory rate.

## 2018-09-06 NOTE — PROGRESS NOTES
Hospitalist Progress Note      PCP: Phillip Herrera MD    Date of Admission: 9/4/2018    Chief Complaint: Fall, leg pain    Hospital Course: 80 y.o. male who presented to [facility] with accidental fall and subsequent L hip pain. Hx obtained from pt and family given pt with advanced dementia. Hx of falls, per chart review, had a couple falls recently at home req short rehab stay. Fell walking to bathroom, slipped, twisted and fell onto L side. No LOC. Hx of HTN, OA, Alzheimer's dementia.     Was admitted to [facility] with left femur fracture      Subjective: S/P Hip pining, post operative had respiratory distress requiring transfer to [facility] care, underwent bronchoscopy.  Only able to verbalize at this time that his hip hurts     Medications:  Reviewed    Infusion Medications    sodium chloride 50 mL/hr at 09/05/18 0136     Scheduled Medications    piperacillin-tazobactam  3.375 g Intravenous Q8H    vancomycin (VANCOCIN) intermittent dosing (placeholder)   Other RX Placeholder    naloxone        methylPREDNISolone  40 mg Intravenous Q12H    amLODIPine  2.5 mg Oral Daily    donepezil  5 mg Oral Nightly    mirtazapine  15 mg Oral Nightly    sodium chloride flush  10 mL Intravenous 2 times per day    enoxaparin  40 mg Subcutaneous Daily    acetaminophen  1,000 mg Oral TID    sennosides-docusate sodium  2 tablet Oral BID     PRN Meds: albuterol, sodium chloride flush, magnesium hydroxide, ondansetron, morphine, oxyCODONE      Intake/Output Summary (Last 24 hours) at 09/06/18 1405  Last data filed at 09/06/18 1012   Gross per 24 hour   Intake              800 ml   Output              700 ml   Net              100 ml       Physical Exam Performed:    /60   Pulse 80   Temp 97.5 °F (36.4 °C) (Axillary)   Resp 19   Ht 5' 10\" (1.778 m)   Wt 120 lb (54.4 kg)   SpO2 94%   BMI 17.22 kg/m²   General appearance: frail elderly male in acute pain, sitting in bed anxious on non rebreather HEENT:  Normal cephalic, atraumatic without obvious deformity. Pupils equal, round, and reactive to light. Extra ocular muscles intact. Conjunctivae/corneas clear. Neck: Supple, with full range of motion. No jugular venous distention. Trachea midline. Respiratory: Tachypnea, decreased respiration   Cardiovascular:  Regular rate and rhythm with normal S1/S2 without murmurs, rubs or gallops. Abdomen: Soft, non-tender, non-distended with normal bowel sounds. Musculoskeletal:  No clubbing, cyanosis or edema bilaterally. Left lower extremity in Urbina's traction  Skin:  pale warm and dry. No rashes or lesions. Scattered bruising   Neurologic:  Neurovascularly intact without any focal sensory/motor deficits. Psychiatric:  defer  Capillary Refill: Brisk,< 3 seconds   Peripheral Pulses: +2 palpable, equal bilaterally     Labs:   Recent Labs      09/04/18   1312  09/05/18   0554  09/06/18   0900   WBC  6.9  7.9  7.7   HGB  13.3*  12.1*  10.6*   HCT  40.1*  36.4*  31.2*   PLT  227  196  160     Recent Labs      09/04/18   1312  09/05/18   0554  09/06/18   0900   NA  142  140  139   K  4.9  4.4  4.2   CL  104  104  103   CO2  26  28  25   BUN  17  20  19   CREATININE  0.7*  0.8  0.8   CALCIUM  9.0  8.5  8.1*     Recent Labs      09/04/18   1312   AST  19   ALT  14   BILITOT  0.3   ALKPHOS  79     No results for input(s): INR in the last 72 hours. No results for input(s): Bonnielee Pericon in the last 72 hours. Urinalysis:    No results found for: Radha Cake, BACTERIA, RBCUA, BLOODU, SPECGRAV, Diya [address]    Radiology:  XR CHEST PORTABLE   Final Result   Left basilar atelectasis or pneumonia. XR HIP LEFT (1 VIEW)   Final Result   Status post left hip arthroplasty. XR HIP LEFT (2-3 VIEWS)   Final Result   Left femoral neck fracture is redemonstrated. No significant change in   configuration is identified on the current examination.          CT CERVICAL SPINE WO CONTRAST   Final Result   No acute abnormality of the cervical spine. Moderate multilevel degenerative changes. CT HEAD WO CONTRAST   Final Result   No acute intracranial abnormality. Fluid level in the right maxillary sinus could represent sinusitis. XR HIP LEFT (2-3 VIEWS)   Final Result   1. Foreshortening of the left femoral neck is suspicious for an acute   fracture. Assessment/Plan:    Active Hospital Problems    Diagnosis Date Noted    Acute hypoxemic respiratory failure ([facility] [J96.01] 09/06/2018    Hypovitaminosis D [E55.9] 09/06/2018    Closed fracture of neck of left femur ([address] [S72.002A] 09/04/2018    Late onset Alzheimer's disease with behavioral disturbance [G30.1, F02.81] 08/13/2018    OA (osteoarthritis) [M19.90] 08/13/2018    Essential hypertension [I10] 08/09/2018    Age-related physical debility [R54] 08/09/2018     Acute respiratory failure with hypoxia - secondary to post op, advanced age, possible HAP   - supportive care low  - CXR- Left basilar atelectasis or pneumonia  - ABG- essentially normal   - Pulm consulted - bronched at bedside removed lrg.  Mucous plugging, awaiting cultures   - continue supportive care, suspect pain is drying his issues   - Monitor pulse ox   - continue abx for now       Closed fracture of the neck of the left femur secondary to mechanical fall in the setting of late onset Alzheimer's patient with behavioral disturbances  - ortho consulted - s/p hip pinning   - PT/OT and weight bearing defer to Ortho service   - DVT PPX  - monitor labs   - Acute pain morphine/ 1 time dose for Valium for spasm     HTN- controlled  - cont home meds     Dementia   - supportive care and safety     Acute blood loss anemia in setting of OR  - expected, currently  controlled   - monitor labs, Tr < 7.0    DVT Prophylaxis: Lovenox   Diet: Diet NPO Effective Now Exceptions are: Ice Chips, Sips with Meds  Code Status: Full Code    PT/OT Eval Status: tp follow   CM for discharge assistance     Dispo - pending recovery from surgery, likely return to facility    Poor prognosis with advanced age and respiratory complication post op     Oscar Jeong, APRN - CNP

## 2018-09-06 NOTE — PROGRESS NOTES
Patient pulling at his aj, unable to redirect. Order for bilateral wrist restraints obtained from Dr. Maya Ceballos. Restraints applied.

## 2018-09-06 NOTE — CONSULTS
Pulmonary & Critical Care Consultation Note   Checo Kaplan MD    REASON FOR CONSULTATION/CC: acute resp failure    Consult at request of  Elpidio Sheffield MD  PCP: Lila Aranda MD    HISTORY OF PRESENT ILLNESS:   80y.o. year old male without significant respiratory or smoking history, was treated outpatient for acute bronchitis with azithromycin last month by his PCP. He presented to the hospital with a mechanical fall and subsequent left hip pain, the patient has underlying dementia so history unable to be obtained directly. His workup confirmed a left femur fracture. He underwent orthopaedic intervention for this. His postoperative course was complicated by worsening respiratory failure requiring high flow oxygen treatment, he was given solumedrol for stridor along with bronchodilators without relief, NT suctioning was required with reportedly copious bloody secretions. He remains on high flow oxygen, an ABG was obtained that did not show hypercapnia. He was treated for a possible HAP with vancomycin and Zosyn. Pulmonary consulted for assistance. Past Medical History:   Diagnosis Date    Allergic rhinitis     Dementia     Hypertension     OA (osteoarthritis) 8/13/2018       Past Surgical History:   Procedure Laterality Date    APPENDECTOMY      PROSTATECTOMY  1998       Family history is unknown by patient.     Social History   Substance Use Topics    Smoking status: Never Smoker    Smokeless tobacco: Never Used    Alcohol use No        Scheduled Meds:   LORazepam        ipratropium-albuterol  1 ampule Inhalation Q4H    piperacillin-tazobactam  3.375 g Intravenous Q8H    vancomycin (VANCOCIN) intermittent dosing (placeholder)   Other RX Placeholder    naloxone        methylPREDNISolone  40 mg Intravenous Q12H    methylPREDNISolone sodium        methylPREDNISolone sodium        amLODIPine  2.5 mg Oral Daily    donepezil  5 mg Oral Nightly    mirtazapine  15 mg Oral Nightly    sodium chloride flush  10 mL Intravenous 2 times per day    enoxaparin  40 mg Subcutaneous Daily    acetaminophen  1,000 mg Oral TID    sennosides-docusate sodium  2 tablet Oral BID       Continuous Infusions:   sodium chloride 50 mL/hr at 09/05/18 0136       PRN Meds:  albuterol, sodium chloride flush, magnesium hydroxide, ondansetron, morphine, oxyCODONE  Inpatient consult to Pulmonology  Consult performed by: Kyung Daniel ordered by: Orlando Army:  Patient is allergic to claritin [loratadine]. REVIEW OF SYSTEMS:  Review of Systems   Unable to perform ROS: Severe respiratory distress       Objective:   PHYSICAL EXAM:  /77   Pulse 104   Temp 97.6 °F (36.4 °C) (Axillary)   Resp 18   Ht 5' 10\" (1.778 m)   Wt 120 lb (54.4 kg)   SpO2 96%   BMI 17.22 kg/m²    Physical Exam   Constitutional: He appears well-developed. He appears distressed. HENT:   Head: Normocephalic and atraumatic. Mouth/Throat: Oropharynx is clear and moist. No oropharyngeal exudate. Eyes: Pupils are equal, round, and reactive to light. EOM are normal.   Neck: Neck supple. No JVD present. Cardiovascular: Normal heart sounds. Exam reveals no gallop and no friction rub. No murmur heard. Pulmonary/Chest: He is in respiratory distress. He has no wheezes. He has no rales. Inspiratory stridor    Abdominal: Soft. Bowel sounds are normal. He exhibits no distension. There is no tenderness. Lymphadenopathy:     He has no cervical adenopathy. Neurological:   CN 2-12 grossly intact   Skin: Skin is warm and dry. No rash noted. He is not diaphoretic.           Data Reviewed:   LABS:  CBC:   Recent Labs      09/04/18   1312  09/05/18   0554   WBC  6.9  7.9   HGB  13.3*  12.1*   HCT  40.1*  36.4*   MCV  92.9  93.1   PLT  227  196     BMP:   Recent Labs      09/04/18   1312  09/05/18   0554   NA  142  140   K  4.9  4.4   CL  104  104   CO2  26  28   BUN  17  20   CREATININE  0.7*  0.8     LIVER

## 2018-09-06 NOTE — PROGRESS NOTES
15 94 %   09/05/18 2155 103/74 - - 138 18 92 %   09/05/18 2150 83/70 - - 137 24 94 %   09/05/18 2145 109/70 98.9 °F (37.2 °C) Temporal 135 25 91 %       General: appears stated age and cooperative   Wound: Wound clean and dry no evidence of infection. , No Drainage and Positive for Edema   Motion: Painful range of Motion   DVT Exam: No evidence of DVT seen on physical exam.       NVI in lower extremity. Thigh swollen but soft. Moving foot and ankle. Data Review  CBC: Lab Results   Component Value Date    WBC 7.7 09/06/2018    RBC 3.38 09/06/2018    HGB 10.6 09/06/2018    HCT 31.2 09/06/2018     09/06/2018       Assessment:     Status Post left Hip Hemiarthroplasty. Plan:      1: Continues current post-op course :  PT/OT as able.   Will need SNF.  2:  Continue Deep venous thrombosis prophylaxis  3:  Continue physical therapy  4:  Continue Pain Control    Agree with the above

## 2018-09-06 NOTE — PROGRESS NOTES
RESPIRATORY THERAPY ASSESSMENT    Name:  Yeison Davies  Medical Record Number:  [id number]  Age: 80 y.o. Gender: male  : 1924  Today's Date:  2018  Room:  0528/0528-01    Assessment     Is the patient being admitted for a COPD or Asthma exacerbation? No   (If yes the patient will be seen every 4 hours for the first 24 hours and then reassessed)    Patient Admission Diagnosis      Allergies  Allergies   Allergen Reactions    Claritin [Loratadine]        Minimum Predicted Vital Capacity:    N/A         Actual Vital Capacity:      N/A          Pulmonary History:No history  Home Oxygen Therapy:  room air  Home Respiratory Therapy:Albuterol  Current Respiratory Therapy:  Albuterol          Respiratory Severity Index(RSI)   Patients with orders for inhalation medications, oxygen, or any therapeutic treatment modality will be placed on Respiratory Protocol. They will be assessed with the first treatment and at least every 72 hours thereafter. The following severity scale will be used to determine frequency of treatment intervention. Smoking History: No Smoking History = 0    Social History  Social History   Substance Use Topics    Smoking status: Never Smoker    Smokeless tobacco: Never Used    Alcohol use No       Recent Surgical History: Surgery of Extremities = 1  Past Surgical History  Past Surgical History:   Procedure Laterality Date    APPENDECTOMY      PROSTATECTOMY         Level of Consciousness: Lethargic = 3    Level of Activity: Non weight bearing- transfers bed to chair only = 3    Respiratory Pattern: Regular Pattern; RR 8-20 = 0    Breath Sounds: Diminshed bilaterally and/or crackles = 2    Sputum  Sputum Color: Other (Comment) (blood tinged), Tenacity:  Thick,    Cough: Strong, spontaneous, non-productive = 0    Vital Signs   /80   Pulse 116   Temp 98.5 °F (36.9 °C) (Axillary)   Resp 21   Ht 5' 10\" (1.778 m)   Wt 120 lb (54.4 kg)   SpO2 93%   BMI 17.22 kg/m²   SPO2 medication orders will be delivered and/or substituted as outlined below. Aerosolized Medications Ordering and Administration Guidelines:    1. All Medications will be ordered by a physician, and their frequency and/or modality will be adjusted as defined by the patients Respiratory Severity Index (RSI) score. 2. If the patient does not have documented COPD, consider discontinuing anticholinergics when RSI is less than 9.  3. If the bronchospasm worsens (increased RSI), then the bronchodilator frequency can be increased to a maximum of every 4 hours. If greater than every 4 hours is required, the physician will be contacted. 4. If the bronchospasm improves, the frequency of the bronchodilator can be decreased, based on the patient's RSI, but not less than home treatment regimen frequency. 5. Bronchodilator(s) will be discontinued if patient has a RSI less than 9 and has received no scheduled or as needed treatment for 72  Hrs. Patients Ordered on a Mucolytic Agent:    1. Must always be administered with a bronchodilator. 2. Discontinue if patient experiences worsened bronchospasm, or secretions have lessened to the point that the patient is able to clear them with a cough. Anti-inflammatory and Combination Medications:    1. If the patient lacks prior history of lung disease, is not using inhaled anti-inflammatory medication at home, and lacks wheezing by examination or by history for at least 24 hours, contact physician for possible discontinuation.

## 2018-09-06 NOTE — PROGRESS NOTES
Pt restless, agitated, moaning, yelling \"help me\", pt disoriented x4. Paged cross cover. New orders received.

## 2018-09-07 ENCOUNTER — APPOINTMENT (OUTPATIENT)
Dept: GENERAL RADIOLOGY | Age: 83
DRG: 469 | End: 2018-09-07
Payer: MEDICARE

## 2018-09-07 LAB
ANION GAP SERPL CALCULATED.3IONS-SCNC: 9 MMOL/L (ref 3–16)
BUN BLDV-MCNC: 18 MG/DL (ref 7–20)
CALCIUM SERPL-MCNC: 8.2 MG/DL (ref 8.3–10.6)
CHLORIDE BLD-SCNC: 104 MMOL/L (ref 99–110)
CO2: 25 MMOL/L (ref 21–32)
CREAT SERPL-MCNC: 0.6 MG/DL (ref 0.8–1.3)
GFR AFRICAN AMERICAN: >60
GFR NON-AFRICAN AMERICAN: >60
GLUCOSE BLD-MCNC: 140 MG/DL (ref 70–99)
HCT VFR BLD CALC: 27.4 % (ref 40.5–52.5)
HEMOGLOBIN: 9.6 G/DL (ref 13.5–17.5)
MCH RBC QN AUTO: 32.1 PG (ref 26–34)
MCHC RBC AUTO-ENTMCNC: 35.1 G/DL (ref 31–36)
MCV RBC AUTO: 91.5 FL (ref 80–100)
MRSA SCREEN RT-PCR: NORMAL
PATH CONSULT FLUID: NORMAL
PDW BLD-RTO: 14.1 % (ref 12.4–15.4)
PLATELET # BLD: 157 K/UL (ref 135–450)
PMV BLD AUTO: 8.2 FL (ref 5–10.5)
POTASSIUM SERPL-SCNC: 3.8 MMOL/L (ref 3.5–5.1)
RBC # BLD: 2.99 M/UL (ref 4.2–5.9)
SODIUM BLD-SCNC: 138 MMOL/L (ref 136–145)
VANCOMYCIN RANDOM: 7.4 UG/ML
WBC # BLD: 9.9 K/UL (ref 4–11)

## 2018-09-07 PROCEDURE — G8978 MOBILITY CURRENT STATUS: HCPCS

## 2018-09-07 PROCEDURE — 36415 COLL VENOUS BLD VENIPUNCTURE: CPT

## 2018-09-07 PROCEDURE — 6360000002 HC RX W HCPCS: Performed by: INTERNAL MEDICINE

## 2018-09-07 PROCEDURE — 97162 PT EVAL MOD COMPLEX 30 MIN: CPT

## 2018-09-07 PROCEDURE — 92610 EVALUATE SWALLOWING FUNCTION: CPT

## 2018-09-07 PROCEDURE — 97530 THERAPEUTIC ACTIVITIES: CPT

## 2018-09-07 PROCEDURE — 6370000000 HC RX 637 (ALT 250 FOR IP): Performed by: HOSPITALIST

## 2018-09-07 PROCEDURE — G8988 SELF CARE GOAL STATUS: HCPCS

## 2018-09-07 PROCEDURE — 92611 MOTION FLUOROSCOPY/SWALLOW: CPT

## 2018-09-07 PROCEDURE — 74230 X-RAY XM SWLNG FUNCJ C+: CPT

## 2018-09-07 PROCEDURE — G8996 SWALLOW CURRENT STATUS: HCPCS

## 2018-09-07 PROCEDURE — 99233 SBSQ HOSP IP/OBS HIGH 50: CPT | Performed by: INTERNAL MEDICINE

## 2018-09-07 PROCEDURE — 6360000002 HC RX W HCPCS: Performed by: NURSE PRACTITIONER

## 2018-09-07 PROCEDURE — 71045 X-RAY EXAM CHEST 1 VIEW: CPT

## 2018-09-07 PROCEDURE — 94761 N-INVAS EAR/PLS OXIMETRY MLT: CPT

## 2018-09-07 PROCEDURE — 6360000002 HC RX W HCPCS: Performed by: HOSPITALIST

## 2018-09-07 PROCEDURE — G8987 SELF CARE CURRENT STATUS: HCPCS

## 2018-09-07 PROCEDURE — 92526 ORAL FUNCTION THERAPY: CPT

## 2018-09-07 PROCEDURE — G8997 SWALLOW GOAL STATUS: HCPCS

## 2018-09-07 PROCEDURE — 6360000002 HC RX W HCPCS: Performed by: FAMILY MEDICINE

## 2018-09-07 PROCEDURE — G8979 MOBILITY GOAL STATUS: HCPCS

## 2018-09-07 PROCEDURE — 2580000003 HC RX 258: Performed by: FAMILY MEDICINE

## 2018-09-07 PROCEDURE — 85027 COMPLETE CBC AUTOMATED: CPT

## 2018-09-07 PROCEDURE — 80048 BASIC METABOLIC PNL TOTAL CA: CPT

## 2018-09-07 PROCEDURE — 2060000000 HC ICU INTERMEDIATE R&B

## 2018-09-07 PROCEDURE — 2580000003 HC RX 258: Performed by: HOSPITALIST

## 2018-09-07 PROCEDURE — 97535 SELF CARE MNGMENT TRAINING: CPT

## 2018-09-07 PROCEDURE — 2580000003 HC RX 258: Performed by: INTERNAL MEDICINE

## 2018-09-07 PROCEDURE — 80202 ASSAY OF VANCOMYCIN: CPT

## 2018-09-07 PROCEDURE — 2700000000 HC OXYGEN THERAPY PER DAY

## 2018-09-07 PROCEDURE — 97167 OT EVAL HIGH COMPLEX 60 MIN: CPT

## 2018-09-07 RX ORDER — LORAZEPAM 2 MG/ML
0.5 INJECTION INTRAMUSCULAR EVERY 6 HOURS PRN
Status: DISCONTINUED | OUTPATIENT
Start: 2018-09-07 | End: 2018-09-11 | Stop reason: HOSPADM

## 2018-09-07 RX ADMIN — VANCOMYCIN HYDROCHLORIDE 1000 MG: 1 INJECTION, POWDER, LYOPHILIZED, FOR SOLUTION INTRAVENOUS at 20:19

## 2018-09-07 RX ADMIN — LORAZEPAM 0.5 MG: 2 INJECTION, SOLUTION INTRAMUSCULAR; INTRAVENOUS at 18:02

## 2018-09-07 RX ADMIN — SODIUM CHLORIDE, PRESERVATIVE FREE 10 ML: 5 INJECTION INTRAVENOUS at 20:27

## 2018-09-07 RX ADMIN — MORPHINE SULFATE 2 MG: 2 INJECTION, SOLUTION INTRAMUSCULAR; INTRAVENOUS at 16:57

## 2018-09-07 RX ADMIN — AMLODIPINE BESYLATE 2.5 MG: 2.5 TABLET ORAL at 13:08

## 2018-09-07 RX ADMIN — MIRTAZAPINE 15 MG: 15 TABLET, FILM COATED ORAL at 20:15

## 2018-09-07 RX ADMIN — METHYLPREDNISOLONE SODIUM SUCCINATE 40 MG: 40 INJECTION, POWDER, LYOPHILIZED, FOR SOLUTION INTRAMUSCULAR; INTRAVENOUS at 13:13

## 2018-09-07 RX ADMIN — SENNOSIDES AND DOCUSATE SODIUM 2 TABLET: 8.6; 5 TABLET ORAL at 13:08

## 2018-09-07 RX ADMIN — PIPERACILLIN AND TAZOBACTAM 3.38 G: 3; .375 INJECTION, POWDER, FOR SOLUTION INTRAVENOUS at 13:48

## 2018-09-07 RX ADMIN — OXYCODONE HYDROCHLORIDE 5 MG: 5 TABLET ORAL at 00:36

## 2018-09-07 RX ADMIN — ENOXAPARIN SODIUM 40 MG: 40 INJECTION SUBCUTANEOUS at 10:40

## 2018-09-07 RX ADMIN — SENNOSIDES AND DOCUSATE SODIUM 2 TABLET: 8.6; 5 TABLET ORAL at 20:15

## 2018-09-07 RX ADMIN — SODIUM CHLORIDE, PRESERVATIVE FREE 10 ML: 5 INJECTION INTRAVENOUS at 11:59

## 2018-09-07 RX ADMIN — MORPHINE SULFATE 2 MG: 2 INJECTION, SOLUTION INTRAMUSCULAR; INTRAVENOUS at 11:59

## 2018-09-07 RX ADMIN — ACETAMINOPHEN 1000 MG: 500 TABLET ORAL at 20:15

## 2018-09-07 RX ADMIN — VANCOMYCIN HYDROCHLORIDE 750 MG: 750 INJECTION, POWDER, LYOPHILIZED, FOR SOLUTION INTRAVENOUS at 00:50

## 2018-09-07 RX ADMIN — SODIUM CHLORIDE: 9 INJECTION, SOLUTION INTRAVENOUS at 16:57

## 2018-09-07 RX ADMIN — OXYCODONE HYDROCHLORIDE 5 MG: 5 TABLET ORAL at 04:07

## 2018-09-07 RX ADMIN — PIPERACILLIN AND TAZOBACTAM 3.38 G: 3; .375 INJECTION, POWDER, FOR SOLUTION INTRAVENOUS at 06:27

## 2018-09-07 RX ADMIN — ACETAMINOPHEN 1000 MG: 500 TABLET ORAL at 13:08

## 2018-09-07 RX ADMIN — PIPERACILLIN AND TAZOBACTAM 3.38 G: 3; .375 INJECTION, POWDER, FOR SOLUTION INTRAVENOUS at 22:21

## 2018-09-07 RX ADMIN — DONEPEZIL HYDROCHLORIDE 5 MG: 5 TABLET, FILM COATED ORAL at 20:15

## 2018-09-07 RX ADMIN — METHYLPREDNISOLONE SODIUM SUCCINATE 40 MG: 40 INJECTION, POWDER, LYOPHILIZED, FOR SOLUTION INTRAMUSCULAR; INTRAVENOUS at 20:27

## 2018-09-07 RX ADMIN — MORPHINE SULFATE 2 MG: 2 INJECTION, SOLUTION INTRAMUSCULAR; INTRAVENOUS at 22:21

## 2018-09-07 RX ADMIN — OXYCODONE HYDROCHLORIDE 5 MG: 5 TABLET ORAL at 13:08

## 2018-09-07 ASSESSMENT — PAIN SCALES - PAIN ASSESSMENT IN ADVANCED DEMENTIA (PAINAD)
NEGVOCALIZATION: 0
TOTALSCORE: 0
BODYLANGUAGE: 2
TOTALSCORE: 6
CONSOLABILITY: 1
CONSOLABILITY: 0
CONSOLABILITY: 1
TOTALSCORE: 0
FACIALEXPRESSION: 0
TOTALSCORE: 7
CONSOLABILITY: 1
BODYLANGUAGE: 1
BODYLANGUAGE: 1
FACIALEXPRESSION: 2
BODYLANGUAGE: 0
NEGVOCALIZATION: 1
BREATHING: 0
CONSOLABILITY: 0
BREATHING: 0
BODYLANGUAGE: 0
NEGVOCALIZATION: 2
TOTALSCORE: 0
BREATHING: 1
BODYLANGUAGE: 0
BREATHING: 0
NEGVOCALIZATION: 0
FACIALEXPRESSION: 0
NEGVOCALIZATION: 0
FACIALEXPRESSION: 0
CONSOLABILITY: 0
FACIALEXPRESSION: 2
NEGVOCALIZATION: 1
FACIALEXPRESSION: 0
BREATHING: 0
TOTALSCORE: 3
BREATHING: 0

## 2018-09-07 ASSESSMENT — PAIN SCALES - GENERAL
PAINLEVEL_OUTOF10: 4
PAINLEVEL_OUTOF10: 7
PAINLEVEL_OUTOF10: 7

## 2018-09-07 NOTE — PROGRESS NOTES
History  Lives With: Son  Type of Home: House  Home Layout: Two level, Bed/Bath upstairs, 1/2 bath on main level (stair lift to second floor )  Home Access: Stairs to enter without rails  Entrance Stairs - Number of Steps: 1  Bathroom Shower/Tub: Walk-in shower  Bathroom Toilet: Standard  Bathroom Equipment: Shower chair, Grab bars around toilet  Home Equipment: [employer], Rolling walker, 4 wheeled walker  Receives Help From: Home health, Family ([facility] for RN, OT, PT)  ADL Assistance: Needs assistance  Homemaking Responsibilities: No  Ambulation Assistance: Needs assistance (Uses RW at home and has assist x1 for transfers and standing balance. Son reports that he provided standing balance support during ADLs.)  Transfer Assistance: Needs assistance  Active : No  Additional Comments: Hx of falls     Objective   Vision: Impaired  Hearing: Exceptions to [facility]  Hearing Exceptions: Hard of hearing/hearing concerns    Orientation  Overall Orientation Status: Impaired  Orientation Level: Oriented to person;Oriented to place; Disoriented to time     Balance  Sitting Balance: Minimal assistance (improved to SBA)  Standing Balance: Unable to assess(comment)  Standing Balance  Activity: Pt sat EOB >5 min with assist needed for sitting balance initially. Once seated EOB, pt able to sit with SBA. ADL  Grooming: Minimal assistance (wash face )  LE Dressing: Dependent/Total  Toileting: Dependent/Total (aj)  Tone RUE  RUE Tone: Normotonic  Tone LUE  LUE Tone: Normotonic  Coordination  Movements Are Fluid And Coordinated: Yes     Bed mobility  Supine to Sit: Dependent/Total (max x2)  Sit to Supine: Dependent/Total (max x2)      Cognition  Overall Cognitive Status: Exceptions  Following Commands: Follows one step commands with repetition; Follows one step commands with increased time  Attention Span: Attends with cues to redirect  Memory: Decreased long term memory;Decreased short term memory;Decreased recall of goals  Time Frame for Short term goals: for 1 week  Short term goal 1: Perform grooming with SBA  Short term goal 2: Perform functional transfers with mod x2 by 9/16  Short term goal 3: Perform LE dressing with mod A for pants  Patient Goals   Patient goals : Pt did not state     Therapy Time   Individual Concurrent Group Co-treatment   Time In 1152         Time Out 1220         Minutes 28         Timed Code Treatment Minutes: 18 Minutes (10 min eval )     Emily Denis OTR/L

## 2018-09-07 NOTE — ANESTHESIA POSTPROCEDURE EVALUATION
Department of Anesthesiology  Postprocedure Note    Patient: Mlili Diaz  MRN: [id number]  YOB: 1924  Date of evaluation: 9/6/2018  Time:  8:36 PM     Procedure Summary     Date:  09/05/18 Room / Location:  [place] [address] / [facility] OR    Anesthesia Start:  2016 Anesthesia Stop:  2149    Procedure:  LEFT HIP HEMIARTHROPLASTY (Left Hip) Diagnosis:  (CLOSED FRACTURE OF LEFT FEMUR NECK)    Surgeon:  Dedrick Carrasquillo MD Responsible Provider:  Lisa Song MD    Anesthesia Type:  general ASA Status:  3          Anesthesia Type: general    Meaghan Phase I: Meaghan Score: 8    Meaghan Phase II:      Last vitals: Reviewed and per EMR flowsheets.        Anesthesia Post Evaluation    Patient location during evaluation: bedside  Patient participation: complete - patient cannot participate (patient has alzheimers dementia and moaning as per his baseline pre op.  confirmed this about baseline for patient with son and grand daughter pre op. )  Level of consciousness: awake and agitated  Airway patency: stridor  Complications: no  Cardiovascular status: blood pressure returned to baseline  Respiratory status: airway suctioned and face mask  Comments: Postoperative Anesthesia Note    Name:    Milli Diaz  MRN:      [id number]    Patient Vitals in the past 12 hrs:  09/06/18 1935, BP:(!) 94/56, Temp:98.2 °F (36.8 °C), Temp src:Axillary, Pulse:89, Resp:18  09/06/18 1524, BP:127/79, Temp:97.4 °F (36.3 °C), Temp src:Oral, Pulse:103, Resp:20, SpO2:98 %  09/06/18 1300, BP:102/60  09/06/18 1246, BP:(!) 89/51, Pulse:80, SpO2:94 %  09/06/18 1230, BP:104/67, Pulse:101, SpO2:99 %  09/06/18 1218, BP:(!) 117/103, Pulse:102  09/06/18 1214, BP:(!) 144/80, Pulse:93, SpO2:95 %  09/06/18 1200, Pulse:96  09/06/18 1147, SpO2:(!) 88 %  09/06/18 1140, BP:(!) 144/80, Temp:97.5 °F (36.4 °C), Temp src:Axillary, Pulse:75, Resp:19, SpO2:96 %     LABS:    CBC  Lab Results       Component                Value               Date/Time                  WBC

## 2018-09-07 NOTE — PROGRESS NOTES
Normal cephalic, atraumatic without obvious deformity. Pupils equal, round, and reactive to light. Extra ocular muscles intact. Conjunctivae/corneas clear. Neck: Supple, with full range of motion. No jugular venous distention. Trachea midline. Respiratory: Even and easy, decreased bilaterally. Liter per NC   Cardiovascular:  Regular rate and rhythm with normal S1/S2 without murmurs, rubs or gallops. Abdomen: Soft, non-tender, non-distended with normal bowel sounds. Musculoskeletal:  No clubbing, cyanosis or edema bilaterally. Left Hip dressing   Skin:  pale warm and dry. No rashes or lesions. Scattered bruising   Neurologic:  Neurovascularly intact without any focal sensory/motor deficits. Psychiatric:  defer  Capillary Refill: Brisk,< 3 seconds   Peripheral Pulses: +2 palpable, equal bilaterally     Labs:   Recent Labs      09/05/18   0554  09/06/18   0900  09/07/18   0444   WBC  7.9  7.7  9.9   HGB  12.1*  10.6*  9.6*   HCT  36.4*  31.2*  27.4*   PLT  196  160  157     Recent Labs      09/05/18   0554  09/06/18   0900  09/07/18   0444   NA  140  139  138   K  4.4  4.2  3.8   CL  104  103  104   CO2  28  25  25   BUN  20  19  18   CREATININE  0.8  0.8  0.6*   CALCIUM  8.5  8.1*  8.2*     Recent Labs      09/04/18   1312   AST  19   ALT  14   BILITOT  0.3   ALKPHOS  79     No results for input(s): INR in the last 72 hours. No results for input(s): Kiesha Bartlett in the last 72 hours. Urinalysis:    No results found for: [place], BACTERIA, RBCUA, BLOODU, SPECGRAV, [address]    Radiology:  FL MODIFIED BARIUM SWALLOW W VIDEO   Final Result   Aspiration with thin liquid. Laryngeal penetration with nectar consistency. Please see separate speech pathology report for full discussion of findings   and recommendations. XR CHEST PORTABLE   Final Result   Low volume exam, with increasing bibasilar airspace disease as compared to   prior.          XR CHEST PORTABLE   Final Result   Left basilar atelectasis or pneumonia. XR HIP LEFT (1 VIEW)   Final Result   Status post left hip arthroplasty. XR HIP LEFT (2-3 VIEWS)   Final Result   Left femoral neck fracture is redemonstrated. No significant change in   configuration is identified on the current examination. CT CERVICAL SPINE WO CONTRAST   Final Result   No acute abnormality of the cervical spine. Moderate multilevel degenerative changes. CT HEAD WO CONTRAST   Final Result   No acute intracranial abnormality. Fluid level in the right maxillary sinus could represent sinusitis. XR HIP LEFT (2-3 VIEWS)   Final Result   1. Foreshortening of the left femoral neck is suspicious for an acute   fracture. Assessment/Plan:    Active Hospital Problems    Diagnosis Date Noted    Acute hypoxemic respiratory failure ([facility] [address] [J96.01] 09/06/2018    Hypovitaminosis D [E55.9] 09/06/2018    Closed fracture of neck of left femur ([facility] [address] [S72.002A] 09/04/2018    Late onset Alzheimer's disease with behavioral disturbance [G30.1, F02.81] 08/13/2018    OA (osteoarthritis) [M19.90] 08/13/2018    Essential hypertension [I10] 08/09/2018    Age-related physical debility [R54] 08/09/2018     Acute respiratory failure with hypoxia - secondary to post op, advanced age, possible HAP   - supportive care low  - CXR- Left basilar atelectasis or pneumonia  - ABG- essentially normal   - Pulm consulted - bronched at bedside removed lrg.  Mucous plugging, awaiting cultures   - continue supportive care, suspect pain is drying his issues   - Monitor pulse ox   - continue abx for now       Closed fracture of the neck of the left femur secondary to mechanical fall in the setting of late onset Alzheimer's patient with behavioral disturbances  - ortho consulted - s/p hip pinning   - PT/OT and weight bearing defer to Ortho service   - DVT PPX  - monitor labs   - Acute pain morphine/ 1 time dose for Valium for spasm     HTN- controlled  - cont home meds     Dementia   - supportive care and safety     Acute metabolic enceph in the setting of known dementia exacerbated by anesthesia and acute pain   - supportive management, pain mgt     Acute blood loss anemia in setting of OR  - expected, currently  controlled   - monitor labs, Tr < 7.0    DVT Prophylaxis: Lovenox   Diet: Diet NPO Effective Now Exceptions are: Ice Chips, Sips with Meds  Code Status: Full Code    PT/OT Eval Status: tp follow   CM for discharge assistance     Dispo - pending recovery from surgery, likely return to facility    Poor prognosis with advanced age and respiratory complication post op     Yung Oliver, APRN - CNP

## 2018-09-07 NOTE — PLAN OF CARE
Problem: Nutrition  Intervention: Swallowing evaluation  Bedside swallow evaluation completed this date. All further notes may be found in EMR.     Alley Martinez, [address] [facility], [address] [facility]  Speech Language Pathologist  Speech Desk: [phone]

## 2018-09-07 NOTE — PROGRESS NOTES
upstairs, 1/2 bath on main level (stair lift to second floor )  Home Access: Stairs to enter without rails  Entrance Stairs - Number of Steps: 1  Bathroom Shower/Tub: Walk-in shower  Bathroom Toilet: Standard  Bathroom Equipment: Shower chair, Grab bars around toilet  Home Equipment: Nelson Cody, Rolling walker, 4 wheeled walker  Receives Help From: Home health, Family ([facility] for RN, OT, PT)  ADL Assistance: Needs assistance  Homemaking Responsibilities: No  Ambulation Assistance: Needs assistance (Uses RW at home and has assist x1 for transfers and standing balance. Son reports that he provided standing balance support during ADLs.)  Transfer Assistance: Needs assistance  Active : No  Additional Comments: Hx of falls  Objective  AROM RLE (degrees)  RLE AROM: WFL  AROM LLE (degrees)  LLE AROM : Exceptions  LLE General AROM: hip limited by pain and ROM restrictions  Strength RLE  Strength RLE: WFL  Strength LLE  Strength LLE: Exception  Comment: hip 3/5, knee ext 3+/5     Sensation  Overall Sensation Status: WFL  Bed mobility  Supine to Sit: Dependent/Total (max x2; cues for technique)  Sit to Supine: Dependent/Total (max x2; cues for technique)  Scooting: Maximal assistance (at EOB)  Transfers  Sit to Stand: Unable to assess  Stand to sit: Unable to assess  Comment: deferred transfers secondary to fatigue  Ambulation  Ambulation?: No (unsafe at this time)  WB Status: WBAT LLE     Balance  Sitting - Static: Fair  Sitting - Dynamic: Fair  Comments: Pt sat EOB x9 minutes with mod A initially progressing to SBA  Exercises  Hip Flexion: x10 RLE  Knee Long Arc Quad: x5 BLE     Assessment   Body structures, Functions, Activity limitations: Decreased functional mobility ; Decreased strength;Decreased cognition;Decreased endurance;Decreased balance  Assessment: Pt is a 79 y/o male who presents with left femoral neck fracture. Pt currently demonstrates deficits in bed mobility, transfers and strength below baseline.

## 2018-09-07 NOTE — PROGRESS NOTES
Pulmonary & Critical Care Inpatient Progress Note   Adalgisa Romero MD     REASON FOR TODAY'S VISIT:  Acute resp failure    SUBJECTIVE:   Decreased oxygen requirements to high flow from venturi mask since bronchoscopy with thick mucous plugging relieved. Speech assessment recommending MBSS    Scheduled Meds:   piperacillin-tazobactam  3.375 g Intravenous Q8H    vancomycin (VANCOCIN) intermittent dosing (placeholder)   Other RX Placeholder    methylPREDNISolone  40 mg Intravenous Q12H    amLODIPine  2.5 mg Oral Daily    donepezil  5 mg Oral Nightly    mirtazapine  15 mg Oral Nightly    sodium chloride flush  10 mL Intravenous 2 times per day    enoxaparin  40 mg Subcutaneous Daily    acetaminophen  1,000 mg Oral TID    sennosides-docusate sodium  2 tablet Oral BID       Continuous Infusions:   sodium chloride 50 mL/hr at 09/06/18 2144       PRN Meds:  albuterol, morphine, sodium chloride flush, magnesium hydroxide, ondansetron, oxyCODONE    ALLERGIES:  Patient is allergic to claritin [loratadine]. Objective:   PHYSICAL EXAM:  BP (!) 145/89   Pulse 69   Temp 97.5 °F (36.4 °C) (Axillary)   Resp 18   Ht 5' 10\" (1.778 m)   Wt 131 lb 2.8 oz (59.5 kg)   SpO2 96%   BMI 18.82 kg/m²    Physical Exam   Constitutional: He appears well-developed. He appears distressed. Cachectic elderly male   HENT:   Head: Normocephalic and atraumatic. Mouth/Throat: Oropharynx is clear and moist. No oropharyngeal exudate. Eyes: Pupils are equal, round, and reactive to light. EOM are normal.   Neck: Neck supple. No JVD present. Cardiovascular: Normal heart sounds. Exam reveals no gallop and no friction rub. No murmur heard. Pulmonary/Chest: Effort normal. He has no wheezes. He has rales. Equal chest rise and expansion bilaterally   Abdominal: Soft. Bowel sounds are normal. He exhibits no distension. There is no tenderness. Lymphadenopathy:     He has no cervical adenopathy. Skin: Skin is warm and dry.  No

## 2018-09-07 NOTE — PROGRESS NOTES
Removed bilateral wrist restraints. Reoriented patient about place and situation and importance of leaving tubes and lines alone. Will continue to monitor.

## 2018-09-07 NOTE — PROCEDURES
teaspoon;Nectar cup;Honey teaspoon;Honey cup  Compensatory Swallowing Strategies Attempted: Small bites/sips;Assist feed;Eat/Feed slowly;Upright as possible for all oral intake;Remain upright for 30-45 minutes after meals; No straws  Postural Changes and/or Swallow Maneuvers Trialed: Upright 30 min after meal;Upright 90 degrees  Dysphagia Outcome Severity Scale: Level 3: Moderate dysphagia- Total assisstance, supervision or strategies. Two or more diet consistencies restricted  Penetration-Aspiration Scale (PAS): 8 - Material Enters the airway, passes below the vocal folds, and no effort is made to eject    IMPRESSIONS: Moderate Oropharyngeal dysphagia   · ORAL PHASE of the swallow characterized by premature loss of all consistencies to pharynx prior to swallow initiation. Reduced tongue based retraction across all consistencies. · PHARYNGEAL PHASE of the swallow characterized by premature spillage of all consistencies to level of valleculae prior to swallow initiation, with spillage of thin liquids by straw sips to level of pyriforms. Reduced tongue based retraction contributed to mild-mod pooling in valleculae and vallecular residue after the swallow, with all consistencies. This residual amount did not build across multiple trials. After multiple sips of HTL, NTL, and Thin liquids, pt's swallow mechanism began to show some fatigue, with pt first aspirating thin liquids (below level of the vocal folds, with no cough reflex), then aspiration with cup sip of thin. A repeat trial of NTL resulted in deep penetration to the vocal folds with NTL, with a repeat Honey thick liquid trial still providing the most airway protection with no penetration or aspiration. A cued cough and throat clear were effective in clearing most of aspirated barium liquid. No penetration/aspiration with puree, HTL, soft solid trials.    · Due to overall fatigue of the mechanism resulting in tracheal aspiration with thin liquids, deep

## 2018-09-07 NOTE — PLAN OF CARE
Problem: Musculor/Skeletal Functional Status  Intervention: PT Evaluation/treatment  Return function to baseline.

## 2018-09-07 NOTE — PROGRESS NOTES
Speech Language Pathology  Facility/Department: [facility]   BEDSIDE SWALLOW EVALUATION    NAME: Michael Lo  : 1924  MRN: [id number]    ADMISSION DATE: 2018  ADMITTING DIAGNOSIS: has Essential hypertension; History of recent fall; Age-related physical debility; OA (osteoarthritis); Late onset Alzheimer's disease with behavioral disturbance; Closed fracture of neck of left femur ([address] Acute hypoxemic respiratory failure ([address] and Hypovitaminosis D on his problem list.  ONSET DATE: 2018    Recent Chest Xray/CT of Chest: (2018)  Impression   Low volume exam, with increasing bibasilar airspace disease as compared to   prior. Date of Eval: 2018  Evaluating Therapist: Lakisha Faye    Current Diet level:  Current Diet : NPO  Current Liquid Diet : NPO    Primary Complaint  Patient Complaint: suspected dysphagia    Pain:  Pain Assessment  Patient Currently in Pain: Denies    Reason for Referral  Michael Lo was referred for a bedside swallow evaluation to assess the efficiency of his swallow function, identify signs and symptoms of aspiration and make recommendations regarding safe dietary consistencies, effective compensatory strategies, and safe eating environment. Impression  Dysphagia Diagnosis: Mild oral stage dysphagia; Suspected needs further assessment; Moderate pharyngeal stage dysphagia  Dysphagia Outcome Severity Scale: Level 1: Severe dysphagia- NPO. Unable to tolerate any PO safely (recommend further instrumental evaluation)     Dysphagia Impression : Mild oral stage dysphagia characterized by suspected premature loss of thin liquid bolus to pharynx, audible swallow. Mastication appeared functional with mechanical soft trials of softened liza crackers, with adequate oral clearance.   Moderate-Severe pharyngeal dysphagia characterized by reduced laryngeal elevation based upon palpation of anterior neck during the swallow, as well as immediate throat clearing with puree, mechanical soft, thin liquids by cup and straw sips. O2 sats were variable throughout suspected due to pt's frequent hand movement throughout the evaluation, however ranged from 87-93%. Due to CXR indicating \"increasing bibasilar airspace disease\" from this date, as well as clinical s/s of aspiration at the bedside,  Recommend continue NPO until patient can be evaluated via MBSS. Consider allowing crushed meds in puree until MBSS can be completed. Treatment Plan  Requires SLP Intervention: Yes  Duration/Frequency of Treatment: 3-5x  D/C Recommendations: 24 hour supervision/assistance     Recommended Diet and Intervention  Diet Solids Recommendation: NPO  Liquid Consistency Recommendation: NPO  Recommended Form of Meds: Crushed in puree as able  Therapeutic Interventions: Diet tolerance monitoring;Patient/Family education    Compensatory Swallowing Strategies  Recommend NPO until instrumental swallow eval is completed (MBSS is recommended). Treatment/Goals  Short-term Goals  Timeframe for Short-term Goals: 3-5 visits  Goal 1: Pt will tolerate instrumental swallowing procedure to determine least restrictive and safest PO diet. Goal 2: tbd  Long-term Goals  Timeframe for Long-term Goals: 1 week  Goal 1: Pt will tolerate least restrictive and safest PO diet without clinical signs of aspiration. General  Chart Reviewed: Yes  Comments: Pt with increasing bibasilar airspace disease noted on most recent CXR. Hx of Dementia, left femure fracture, Post-op day 2; Bronch yesterday with mucous plugging. RN reported pt tolerated pudding yesterday, however had coughing with thin liquids. Subjective  Subjective: Alert and agreeable to evaluation. Pt was confused and talkative throughout session. Behavior/Cognition: Alert; Cooperative;Pleasant mood;Confused  Respiratory Status: O2 via nasual cannula  O2 Device: Nasal cannula  Liters of Oxygen: 7 L  Follows Directions: None  Dentition: Adequate; Some missing

## 2018-09-08 ENCOUNTER — APPOINTMENT (OUTPATIENT)
Dept: GENERAL RADIOLOGY | Age: 83
DRG: 469 | End: 2018-09-08
Payer: MEDICARE

## 2018-09-08 LAB
ANION GAP SERPL CALCULATED.3IONS-SCNC: 12 MMOL/L (ref 3–16)
BUN BLDV-MCNC: 19 MG/DL (ref 7–20)
CALCIUM SERPL-MCNC: 8.6 MG/DL (ref 8.3–10.6)
CHLORIDE BLD-SCNC: 104 MMOL/L (ref 99–110)
CO2: 25 MMOL/L (ref 21–32)
CREAT SERPL-MCNC: 0.7 MG/DL (ref 0.8–1.3)
CULTURE, RESPIRATORY: NORMAL
GFR AFRICAN AMERICAN: >60
GFR NON-AFRICAN AMERICAN: >60
GLUCOSE BLD-MCNC: 128 MG/DL (ref 70–99)
GRAM STAIN RESULT: NORMAL
HCT VFR BLD CALC: 31.7 % (ref 40.5–52.5)
HEMOGLOBIN: 10.9 G/DL (ref 13.5–17.5)
MCH RBC QN AUTO: 31.6 PG (ref 26–34)
MCHC RBC AUTO-ENTMCNC: 34.3 G/DL (ref 31–36)
MCV RBC AUTO: 92.1 FL (ref 80–100)
PDW BLD-RTO: 14.2 % (ref 12.4–15.4)
PLATELET # BLD: 181 K/UL (ref 135–450)
PMV BLD AUTO: 8.7 FL (ref 5–10.5)
POTASSIUM SERPL-SCNC: 3.7 MMOL/L (ref 3.5–5.1)
RBC # BLD: 3.44 M/UL (ref 4.2–5.9)
SODIUM BLD-SCNC: 141 MMOL/L (ref 136–145)
VANCOMYCIN TROUGH: 7.6 UG/ML (ref 10–20)
WBC # BLD: 8.7 K/UL (ref 4–11)

## 2018-09-08 PROCEDURE — 6360000002 HC RX W HCPCS: Performed by: INTERNAL MEDICINE

## 2018-09-08 PROCEDURE — 2580000003 HC RX 258: Performed by: HOSPITALIST

## 2018-09-08 PROCEDURE — 92526 ORAL FUNCTION THERAPY: CPT

## 2018-09-08 PROCEDURE — 6370000000 HC RX 637 (ALT 250 FOR IP): Performed by: HOSPITALIST

## 2018-09-08 PROCEDURE — 97530 THERAPEUTIC ACTIVITIES: CPT

## 2018-09-08 PROCEDURE — 99233 SBSQ HOSP IP/OBS HIGH 50: CPT | Performed by: INTERNAL MEDICINE

## 2018-09-08 PROCEDURE — G8987 SELF CARE CURRENT STATUS: HCPCS

## 2018-09-08 PROCEDURE — 6360000002 HC RX W HCPCS: Performed by: HOSPITALIST

## 2018-09-08 PROCEDURE — 2060000000 HC ICU INTERMEDIATE R&B

## 2018-09-08 PROCEDURE — 80202 ASSAY OF VANCOMYCIN: CPT

## 2018-09-08 PROCEDURE — 2580000003 HC RX 258: Performed by: INTERNAL MEDICINE

## 2018-09-08 PROCEDURE — 85027 COMPLETE CBC AUTOMATED: CPT

## 2018-09-08 PROCEDURE — 36415 COLL VENOUS BLD VENIPUNCTURE: CPT

## 2018-09-08 PROCEDURE — 80048 BASIC METABOLIC PNL TOTAL CA: CPT

## 2018-09-08 PROCEDURE — 94761 N-INVAS EAR/PLS OXIMETRY MLT: CPT

## 2018-09-08 PROCEDURE — 2700000000 HC OXYGEN THERAPY PER DAY

## 2018-09-08 PROCEDURE — 71045 X-RAY EXAM CHEST 1 VIEW: CPT

## 2018-09-08 PROCEDURE — 97535 SELF CARE MNGMENT TRAINING: CPT

## 2018-09-08 RX ADMIN — PIPERACILLIN AND TAZOBACTAM 3.38 G: 3; .375 INJECTION, POWDER, FOR SOLUTION INTRAVENOUS at 15:05

## 2018-09-08 RX ADMIN — SODIUM CHLORIDE, PRESERVATIVE FREE 10 ML: 5 INJECTION INTRAVENOUS at 20:37

## 2018-09-08 RX ADMIN — SODIUM CHLORIDE, PRESERVATIVE FREE 10 ML: 5 INJECTION INTRAVENOUS at 10:26

## 2018-09-08 RX ADMIN — PIPERACILLIN AND TAZOBACTAM 3.38 G: 3; .375 INJECTION, POWDER, FOR SOLUTION INTRAVENOUS at 04:20

## 2018-09-08 RX ADMIN — ACETAMINOPHEN 1000 MG: 500 TABLET ORAL at 10:30

## 2018-09-08 RX ADMIN — AMLODIPINE BESYLATE 2.5 MG: 2.5 TABLET ORAL at 10:25

## 2018-09-08 RX ADMIN — PIPERACILLIN AND TAZOBACTAM 3.38 G: 3; .375 INJECTION, POWDER, FOR SOLUTION INTRAVENOUS at 22:53

## 2018-09-08 RX ADMIN — DONEPEZIL HYDROCHLORIDE 5 MG: 5 TABLET, FILM COATED ORAL at 20:37

## 2018-09-08 RX ADMIN — ACETAMINOPHEN 1000 MG: 500 TABLET ORAL at 20:36

## 2018-09-08 RX ADMIN — VANCOMYCIN HYDROCHLORIDE 1000 MG: 1 INJECTION, POWDER, LYOPHILIZED, FOR SOLUTION INTRAVENOUS at 20:37

## 2018-09-08 RX ADMIN — ENOXAPARIN SODIUM 40 MG: 40 INJECTION SUBCUTANEOUS at 10:30

## 2018-09-08 RX ADMIN — SODIUM CHLORIDE, PRESERVATIVE FREE 10 ML: 5 INJECTION INTRAVENOUS at 10:25

## 2018-09-08 RX ADMIN — METHYLPREDNISOLONE SODIUM SUCCINATE 40 MG: 40 INJECTION, POWDER, LYOPHILIZED, FOR SOLUTION INTRAMUSCULAR; INTRAVENOUS at 20:37

## 2018-09-08 RX ADMIN — ACETAMINOPHEN 1000 MG: 500 TABLET ORAL at 15:04

## 2018-09-08 RX ADMIN — MIRTAZAPINE 15 MG: 15 TABLET, FILM COATED ORAL at 20:36

## 2018-09-08 RX ADMIN — SENNOSIDES AND DOCUSATE SODIUM 2 TABLET: 8.6; 5 TABLET ORAL at 20:36

## 2018-09-08 RX ADMIN — LORAZEPAM 0.5 MG: 2 INJECTION, SOLUTION INTRAMUSCULAR; INTRAVENOUS at 04:20

## 2018-09-08 RX ADMIN — METHYLPREDNISOLONE SODIUM SUCCINATE 40 MG: 40 INJECTION, POWDER, LYOPHILIZED, FOR SOLUTION INTRAMUSCULAR; INTRAVENOUS at 10:25

## 2018-09-08 RX ADMIN — LORAZEPAM 0.5 MG: 2 INJECTION, SOLUTION INTRAMUSCULAR; INTRAVENOUS at 22:59

## 2018-09-08 RX ADMIN — SENNOSIDES AND DOCUSATE SODIUM 2 TABLET: 8.6; 5 TABLET ORAL at 10:25

## 2018-09-08 ASSESSMENT — PAIN SCALES - WONG BAKER: WONGBAKER_NUMERICALRESPONSE: 0

## 2018-09-08 ASSESSMENT — PAIN SCALES - PAIN ASSESSMENT IN ADVANCED DEMENTIA (PAINAD)
BREATHING: 0
TOTALSCORE: 0
FACIALEXPRESSION: 0
CONSOLABILITY: 0
BODYLANGUAGE: 0
NEGVOCALIZATION: 0

## 2018-09-08 ASSESSMENT — PAIN SCALES - GENERAL
PAINLEVEL_OUTOF10: 0
PAINLEVEL_OUTOF10: 5

## 2018-09-08 NOTE — PROGRESS NOTES
Occupational Therapy/Physical Therapy  OT/PT treat attempt this am, HOLD at this time per RN, pt in restraints d/t increased agitation. Will re-attempt later this date when medically appropriate.    Tracie Majano OTR/ALESHIA MICHAELT

## 2018-09-08 NOTE — PROGRESS NOTES
Normal cephalic, atraumatic without obvious deformity. Pupils equal, round, and reactive to light. Extra ocular muscles intact. Conjunctivae/corneas clear. Neck: Supple, with full range of motion. No jugular venous distention. Trachea midline. Respiratory: Even and easy, decreased bilaterally. Currently on 6-8 liters per NC  Cardiovascular:  Regular rate and rhythm with normal S1/S2 without murmurs, rubs or gallops. Abdomen: Soft, non-tender, non-distended with normal bowel sounds. Musculoskeletal:  No clubbing, cyanosis or edema bilaterally. Left Hip dressing. CSM intact   Skin:  pale warm and dry. No rashes or lesions. Scattered bruising   Neurologic:  Neurovascularly intact without any focal sensory/motor deficits. Psychiatric:  defer  Capillary Refill: Brisk,< 3 seconds   Peripheral Pulses: +2 palpable, equal bilaterally     Labs:   Recent Labs      09/06/18   0900 09/07/18   0444  09/08/18   0459   WBC  7.7  9.9  8.7   HGB  10.6*  9.6*  10.9*   HCT  31.2*  27.4*  31.7*   PLT  160  157  181     Recent Labs      09/06/18   0900 09/07/18   0444  09/08/18   0459   NA  139  138  141   K  4.2  3.8  3.7   CL  103  104  104   CO2  25  25  25   BUN  19  18  19   CREATININE  0.8  0.6*  0.7*   CALCIUM  8.1*  8.2*  8.6     No results for input(s): AST, ALT, BILIDIR, BILITOT, ALKPHOS in the last 72 hours. No results for input(s): INR in the last 72 hours. No results for input(s): Jessica Mock in the last 72 hours. Urinalysis:    No results found for: Josie Saunas, BACTERIA, RBCUA, BLOODU, SPECGRAV, Diya [address]    Radiology:  FL MODIFIED BARIUM SWALLOW W VIDEO   Final Result   Aspiration with thin liquid. Laryngeal penetration with nectar consistency. Please see separate speech pathology report for full discussion of findings   and recommendations. XR CHEST PORTABLE   Final Result   Low volume exam, with increasing bibasilar airspace disease as compared to   prior.          XR CHEST PORTABLE pinning   - PT/OT and weight bearing defer to Ortho service   - DVT PPX  - monitor labs   - pain mgt     HTN- controlled  - cont home meds     Dementia   - supportive care and safety     Acute metabolic enceph in the setting of known dementia exacerbated by anesthesia and acute pain   - supportive management, pain mgt     Acute blood loss anemia in setting of OR  - expected, currently  controlled   - monitor labs, Tr < 7.0    DVT Prophylaxis: Lovenox   Diet: DIET DENTAL SOFT; Honey Thick  Code Status: Full Code    PT/OT Eval Status: tp follow   CM for discharge assistance     Dispo - pending recovery from surgery, likely return to facility    Poor prognosis with advanced age and respiratory complication post op     Marcell Ceron, APRN - CNP

## 2018-09-08 NOTE — FLOWSHEET NOTE
(Comment)  (surgical incision)   Location Left hip   Skin Integrity Site 2   Skin Integrity Location 2 Abrasion   Location 2 scattered   Skin Integrity Site 3   Skin Integrity Location 3 Bruising   Location 3 scattered   Musculoskeletal   Musculoskeletal (WDL) X   RUE Full movement   LUE Full movement   RL Extremity Full movement   LL Extremity Limited movement;Surgery   Genitourinary   Genitourinary (WDL) X  (aj)   Flank Tenderness MANDY   Suprapubic Tenderness MANDY   Dysuria MANDY   Anus/Rectum   Anus/Rectum (WDL) WDL   Wound 09/05/18 Other (Comment) Buttocks satge 2 pressure injury    Date First Assessed/Time First Assessed: 09/05/18 2330   Wound Type: Other (Comment)  Location: Buttocks  Wound Description (Comments): satge 2 pressure injury   Pre-existing: No  Photo Taken: No   Wound Type Wound   Dressing Status Clean;Dry; Intact   Dressing/Treatment Adhesive bandage   Urethral Catheter Straight-tip 16 fr   Placement Date/Time: 09/04/18 1438   Urethral Catheter Timeout: Patient;Procedure;Site/Side; Availability of Implant;Sterile technique; Appropriate Equipment;Correct Position  Inserted by: MB  Catheter Type: Straight-tip  Tube Size (fr): 16 fr  Catheter. .. Catheter Indications Need for fluid management in critically ill patients in a critical care setting not able to be managed by other means such as BSC with hat, bedpan, urinal, condom catheter, or short term intermittent urethral catherization   Site Assessment Pink   Urine Color Yellow   Urine Appearance Clear   Urine Odor Malodorous   Incision 09/05/18 Hip Left   Date First Assessed/Time First Assessed: 09/05/18 2051   Location: Hip  Wound Location Orientation: Left   Wound Assessment Clean;Dry; Intact   Yolis-wound Assessment Clean;Dry; Intact   Closure Staples   Drainage Amount None   Odor None   Dressing/Treatment Dry dressing   Dressing Status Clean;Dry; Intact   Psychosocial   Psychosocial (WDL) X  (advanced dementia)   Patient Behaviors

## 2018-09-08 NOTE — PROGRESS NOTES
INPATIENT PULMONARY CRITICAL CARE PROGRESS NOTE      Reason for visit: Close fracture neck of left femur, closed head injury, dementia. Status post left hip hemiarthroplasty 9/5/18. Acute hypoxemic respiratory failure postop, mucus plugging and hemoptysis. Bronchoscopy with therapeutic aspiration 9/6. Had been treated for bronchitis as an outpatient prior to admission. SUBJECTIVE:  Afebrile and hemodynamically stable, still requiring high flow oxygen at 8 LPM.  Form in bed without oxygen on. Appears mildly confused. Appears frail with poor cough that is wet sounding. Physical Exam:  Blood pressure 114/65, pulse 64, temperature 97.9 °F (36.6 °C), resp. rate 18, height 5' 10\" (1.778 m), weight 132 lb 11.5 oz (60.2 kg), SpO2 100 %.'   Constitutional:  Elderly, frail, coughing intermittently. HENT: Oropharynx is clear and dry. Dentures, class III airway. Eyes:  Conjunctivae are normal. Pupils equal, round, and reactive to light. No scleral icterus. Neck: No JVD. No tracheal deviation present. No obvious thyroid mass. Cardiovascular: Normal rate, regular rhythm, normal heart sounds. No right ventricular heave. No lower extremity edema. Pulmonary/Chest: No wheezes. Few rales. Mild accessory muscle usage, no stridor. Abdominal: Soft. Bowel sounds present. Mildly protuberant, no tenderness. Musculoskeletal: No cyanosis. No clubbing. No obvious joint deformity except related to hip fracture. Poor muscle mass. .   Lymphadenopathy: No cervical or supraclavicular adenopathy. Skin: Skin is warm and dry. No rash or nodules on the exposed extremities. Thin atrophic skin with ecchymoses  Psychiatric: Appears mildly confused. Neurologic: Confused, awake and oriented. PERRL.   Speech fluent        Results:  CBC:   Recent Labs      09/06/18   0900  09/07/18   0444  09/08/18   0459   WBC  7.7  9.9  8.7   HGB  10.6*  9.6*  10.9*   HCT  31.2*  27.4*  31.7*   MCV  92.1  91.5  92.1   PLT  160  157  181 or definite evidence for pneumothorax. Left basilar atelectasis or pneumonia. Fl Modified Barium Swallow W Video    Result Date: 9/7/2018  EXAMINATION: MODIFIED BARIUM SWALLOW WAS PERFORMED IN CONJUNCTION WITH SPEECH PATHOLOGY SERVICES TECHNIQUE: Fluoroscopic evaluation of the swallowing mechanism was performed with multiple consistency of barium product. FLUOROSCOPY DOSE AND TYPE OR TIME AND EXPOSURES: 1.6 minutes fluoroscopy, 2 fluoroscopic images COMPARISON: None HISTORY: ORDERING SYSTEM PROVIDED HISTORY: aspiration TECHNOLOGIST PROVIDED HISTORY: Reason for exam:->aspiration Ordering Physician Provided Reason for Exam: 1.6 mins of fluoro FINDINGS: Aspiration was seen with thin liquid. Deep laryngeal penetration was seen with nectar consistency. Patient tolerated additional consistencies. Intermittent vallecular pooling was seen. Aspiration with thin liquid. Laryngeal penetration with nectar consistency. Please see separate speech pathology report for full discussion of findings and recommendations. Assessment and plan:  Acute hypoxemic respiratory failure ([address] CXR 9/6 with likely bibasilar atelectasis/mucus plugging. Was on oxygen at 14 LPM, now on high flow cannula at 8 LPM.  Will repeat CXR. Mucus plugging. Status post therapeutic aspiration 9/6, cultures negative so far. Continue aggressive pulmonary toilet, still has loose congested cough without inability to clear secretions. Closed fracture of neck of left femur ([address] Status post hemiarthroplasty. Anemia. Probably from hip fracture and surgery. Stable. Essential hypertension, DJD, Alzheimer's disease, hypovitaminosis D. Per IM. His prognosis remains guarded due to frailty and poor respiratory reserve.       Electronically signed by:  Bell Wick MD    9/8/2018    5:04 PM.

## 2018-09-08 NOTE — PROGRESS NOTES
with all consistencies. This residual amount did not build across multiple trials. After multiple sips of HTL, NTL, and Thin liquids, pt's swallow mechanism began to show some fatigue, with pt first aspirating thin liquids (below level of the vocal folds, with no cough reflex), then aspiration with cup sip of thin. A repeat trial of NTL resulted in deep penetration to the vocal folds with NTL, with a repeat Honey thick liquid trial still providing the most airway protection with no penetration or aspiration. A cued cough and throat clear were effective in clearing most of aspirated barium liquid. No penetration/aspiration with puree, HTL, soft solid trials. ? Due to overall fatigue of the mechanism resulting in tracheal aspiration with thin liquids, deep penetration to larynx to level of VF with Nectar liquids, Recommend honey thick liquids / Dental soft foods. Subjective:    Patient seen upright in bed, alert and agreeable to therapy. RN OK'd entry into room. Patient remained restrained during session. Patient teary with SLP, stating \"you're a good person\" after SLP reoriented patient to situation and place. Patient appears to need soothing, comforting reassurances to reduce anxiety. Pain:  None reported. Current Diet:  Dental soft/honey thick liquids    Goals/Treatment  Goal 1: Pt will tolerate instrumental swallowing procedure to determine least restrictive and safest PO diet. 09/07 GOAL MET  Goal 2: Pt will tolerate HTL in 10/10 trials with no clinical signs of aspiration. - 9/8/2018: patient seen with 4 oz of honey thick liquids via tsp (fed by therapist) with delayed swallow, but no overt s/s of asp/pen and no change in RR.    Goal 3: Pt will tolerate dental soft foods in 10/10 trials with no clinical signs of aspiration. - 9/8/2018: patient seen with a few bites of dental soft chicken with BBQ sauce with no overt s/s of asp/pen, mild lingual residue and delayed swallow but no overt s/s of asp/pen. Goal 4: Pt will complete pharyngeal strengthening exercises in 10/10 with mod cues. (attempt, if pt is able to follow directions) 9/8/18: did not attempt this date     Patient/Family/Caregiver Education:  Patient and son educated on role of SLP, recommended diet and how this relates to patient's current medical status. Explained MBSS results. Compensatory Strategies:  · Upright for all oral intake  · Small bites/sips  · Eat slowly  · Alternate solids/liquids  · Oral care     Plan:    Continued dysphagia tx with goals per plan of care    Discharge Recommendations: If pt discharges from hospital prior to Speech/Swallowing discharge, this note serves as tx and discharge summary.      Total Treatment Time:  Time in/out: 3057-7063  Dysphagia tx minutes: 21 mins    Signature:  Pamela Duffy, [address] #[id number]  Speech-Language Pathologist

## 2018-09-08 NOTE — PROGRESS NOTES
to mouth assist;Increased time to complete;Setup;Verbal cueing (Pt asking for food upon arrival, pt unable to bring food to mouth d/t decreased spatial awareness and increase tremors, Increased time required to attempt feed, Mod increasing to Max A to eat apple sauce semi-supine, VCs to swallow before next bite)  Toileting: Dependent/Total (aj)        Balance  Sitting Balance: Unable to assess(comment)  Standing Balance: Unable to assess(comment)  Bed mobility  Rolling to Left: Unable to assess  Rolling to Right: Maximum assistance (Max A x1 with bed rail)  Comment: rolling to the R for pressure relief and positioning in bed   Transfers  Transfer Comments: unable to assess, pt in bed througout session     Cognition  Overall Cognitive Status: Exceptions  Arousal/Alertness: Delayed responses to stimuli  Following Commands: Follows one step commands with repetition; Follows one step commands with increased time  Attention Span: Attends with cues to redirect  Memory: Decreased long term memory;Decreased short term memory;Decreased recall of precautions;Decreased recall of recent events  Safety Judgement: Decreased awareness of need for safety;Decreased awareness of need for assistance  Problem Solving: Assistance required to correct errors made;Assistance required to identify errors made;Assistance required to generate solutions;Assistance required to implement solutions  Insights: Decreased awareness of deficits  Initiation: Requires cues for all  Sequencing: Requires cues for all  Cognition Comment: hx dementia - unable to follow verbal commands without demostration from therapist d/t decreased comprehension     Perception  Overall Perceptual Status: Impaired  Perseveration: Perseverates during conversation (pt asking for help throughout convo, therapist asking pt what he needs and pt responding \"I can't help it I just say that a lot\")              Type of ROM/Therapeutic Exercise  Type of ROM/Therapeutic Exercise:

## 2018-09-09 ENCOUNTER — APPOINTMENT (OUTPATIENT)
Dept: GENERAL RADIOLOGY | Age: 83
DRG: 469 | End: 2018-09-09
Payer: MEDICARE

## 2018-09-09 LAB
ANION GAP SERPL CALCULATED.3IONS-SCNC: 10 MMOL/L (ref 3–16)
BUN BLDV-MCNC: 19 MG/DL (ref 7–20)
CALCIUM SERPL-MCNC: 8.2 MG/DL (ref 8.3–10.6)
CHLORIDE BLD-SCNC: 106 MMOL/L (ref 99–110)
CO2: 28 MMOL/L (ref 21–32)
CREAT SERPL-MCNC: 0.7 MG/DL (ref 0.8–1.3)
GFR AFRICAN AMERICAN: >60
GFR NON-AFRICAN AMERICAN: >60
GLUCOSE BLD-MCNC: 143 MG/DL (ref 70–99)
HCT VFR BLD CALC: 30.5 % (ref 40.5–52.5)
HEMOGLOBIN: 10.4 G/DL (ref 13.5–17.5)
MCH RBC QN AUTO: 31.3 PG (ref 26–34)
MCHC RBC AUTO-ENTMCNC: 34.1 G/DL (ref 31–36)
MCV RBC AUTO: 92 FL (ref 80–100)
PDW BLD-RTO: 14.3 % (ref 12.4–15.4)
PLATELET # BLD: 188 K/UL (ref 135–450)
PMV BLD AUTO: 8.4 FL (ref 5–10.5)
POTASSIUM SERPL-SCNC: 3.5 MMOL/L (ref 3.5–5.1)
RBC # BLD: 3.32 M/UL (ref 4.2–5.9)
SODIUM BLD-SCNC: 144 MMOL/L (ref 136–145)
VANCOMYCIN RANDOM: 10 UG/ML
WBC # BLD: 7.3 K/UL (ref 4–11)

## 2018-09-09 PROCEDURE — 80202 ASSAY OF VANCOMYCIN: CPT

## 2018-09-09 PROCEDURE — 6360000002 HC RX W HCPCS: Performed by: INTERNAL MEDICINE

## 2018-09-09 PROCEDURE — 6360000002 HC RX W HCPCS: Performed by: NURSE PRACTITIONER

## 2018-09-09 PROCEDURE — 85027 COMPLETE CBC AUTOMATED: CPT

## 2018-09-09 PROCEDURE — 2580000003 HC RX 258: Performed by: HOSPITALIST

## 2018-09-09 PROCEDURE — 6360000002 HC RX W HCPCS: Performed by: HOSPITALIST

## 2018-09-09 PROCEDURE — 97530 THERAPEUTIC ACTIVITIES: CPT

## 2018-09-09 PROCEDURE — 80048 BASIC METABOLIC PNL TOTAL CA: CPT

## 2018-09-09 PROCEDURE — 6370000000 HC RX 637 (ALT 250 FOR IP): Performed by: HOSPITALIST

## 2018-09-09 PROCEDURE — 71045 X-RAY EXAM CHEST 1 VIEW: CPT

## 2018-09-09 PROCEDURE — 99232 SBSQ HOSP IP/OBS MODERATE 35: CPT | Performed by: INTERNAL MEDICINE

## 2018-09-09 PROCEDURE — 36415 COLL VENOUS BLD VENIPUNCTURE: CPT

## 2018-09-09 PROCEDURE — 94761 N-INVAS EAR/PLS OXIMETRY MLT: CPT

## 2018-09-09 PROCEDURE — 2060000000 HC ICU INTERMEDIATE R&B

## 2018-09-09 PROCEDURE — 2580000003 HC RX 258: Performed by: INTERNAL MEDICINE

## 2018-09-09 PROCEDURE — 2700000000 HC OXYGEN THERAPY PER DAY

## 2018-09-09 RX ORDER — LORAZEPAM 2 MG/ML
0.5 INJECTION INTRAMUSCULAR ONCE
Status: COMPLETED | OUTPATIENT
Start: 2018-09-09 | End: 2018-09-09

## 2018-09-09 RX ORDER — PREDNISONE 10 MG/1
30 TABLET ORAL DAILY
Status: DISCONTINUED | OUTPATIENT
Start: 2018-09-10 | End: 2018-09-11 | Stop reason: HOSPADM

## 2018-09-09 RX ADMIN — SODIUM CHLORIDE, PRESERVATIVE FREE 10 ML: 5 INJECTION INTRAVENOUS at 10:40

## 2018-09-09 RX ADMIN — MIRTAZAPINE 15 MG: 15 TABLET, FILM COATED ORAL at 23:00

## 2018-09-09 RX ADMIN — SODIUM CHLORIDE, PRESERVATIVE FREE 10 ML: 5 INJECTION INTRAVENOUS at 14:46

## 2018-09-09 RX ADMIN — DONEPEZIL HYDROCHLORIDE 5 MG: 5 TABLET, FILM COATED ORAL at 22:59

## 2018-09-09 RX ADMIN — SENNOSIDES AND DOCUSATE SODIUM 2 TABLET: 8.6; 5 TABLET ORAL at 22:59

## 2018-09-09 RX ADMIN — PIPERACILLIN AND TAZOBACTAM 3.38 G: 3; .375 INJECTION, POWDER, FOR SOLUTION INTRAVENOUS at 13:21

## 2018-09-09 RX ADMIN — ACETAMINOPHEN 1000 MG: 500 TABLET ORAL at 13:21

## 2018-09-09 RX ADMIN — PIPERACILLIN AND TAZOBACTAM 3.38 G: 3; .375 INJECTION, POWDER, FOR SOLUTION INTRAVENOUS at 22:59

## 2018-09-09 RX ADMIN — METHYLPREDNISOLONE SODIUM SUCCINATE 40 MG: 40 INJECTION, POWDER, LYOPHILIZED, FOR SOLUTION INTRAMUSCULAR; INTRAVENOUS at 10:38

## 2018-09-09 RX ADMIN — SODIUM CHLORIDE: 9 INJECTION, SOLUTION INTRAVENOUS at 13:21

## 2018-09-09 RX ADMIN — PIPERACILLIN AND TAZOBACTAM 3.38 G: 3; .375 INJECTION, POWDER, FOR SOLUTION INTRAVENOUS at 06:38

## 2018-09-09 RX ADMIN — ENOXAPARIN SODIUM 40 MG: 40 INJECTION SUBCUTANEOUS at 10:38

## 2018-09-09 RX ADMIN — ACETAMINOPHEN 1000 MG: 500 TABLET ORAL at 10:37

## 2018-09-09 RX ADMIN — SENNOSIDES AND DOCUSATE SODIUM 2 TABLET: 8.6; 5 TABLET ORAL at 10:38

## 2018-09-09 RX ADMIN — ACETAMINOPHEN 1000 MG: 500 TABLET ORAL at 22:59

## 2018-09-09 RX ADMIN — VANCOMYCIN HYDROCHLORIDE 1000 MG: 1 INJECTION, POWDER, LYOPHILIZED, FOR SOLUTION INTRAVENOUS at 18:33

## 2018-09-09 RX ADMIN — LORAZEPAM 0.5 MG: 2 INJECTION, SOLUTION INTRAMUSCULAR; INTRAVENOUS at 04:02

## 2018-09-09 RX ADMIN — AMLODIPINE BESYLATE 2.5 MG: 2.5 TABLET ORAL at 10:38

## 2018-09-09 RX ADMIN — LORAZEPAM 0.5 MG: 2 INJECTION, SOLUTION INTRAMUSCULAR; INTRAVENOUS at 14:46

## 2018-09-09 ASSESSMENT — PAIN SCALES - GENERAL
PAINLEVEL_OUTOF10: 0

## 2018-09-09 NOTE — CARE COORDINATION
[facility] insurance at this time. CM made son aware.

## 2018-09-09 NOTE — PROGRESS NOTES
Hospitalist Progress Note      PCP: Virgilio Prather MD    Date of Admission: 9/4/2018    Chief Complaint: Fall, leg pain    Hospital Course: 80 y.o. male who presented to [facility] with accidental fall and subsequent L hip pain. Hx obtained from pt and family given pt with advanced dementia. Hx of falls, per chart review, had a couple falls recently at home req short rehab stay. Fell walking to bathroom, slipped, twisted and fell onto L side. No LOC. Hx of HTN, OA, Alzheimer's dementia.     Was admitted to [facility] with left femur fracture    S/P Hip pining, post operative had respiratory distress requiring transfer to [facility] care, underwent bronchoscopy. Subjective: No acute events overnight. Reports his pain is better. O2 requirements have imporoved.  Sitting up in chair eating breakfast         Medications:  Reviewed    Infusion Medications    sodium chloride 50 mL/hr at 09/07/18 1657     Scheduled Medications    piperacillin-tazobactam  3.375 g Intravenous Q8H    vancomycin (VANCOCIN) intermittent dosing (placeholder)   Other RX Placeholder    methylPREDNISolone  40 mg Intravenous Q12H    amLODIPine  2.5 mg Oral Daily    donepezil  5 mg Oral Nightly    mirtazapine  15 mg Oral Nightly    sodium chloride flush  10 mL Intravenous 2 times per day    enoxaparin  40 mg Subcutaneous Daily    acetaminophen  1,000 mg Oral TID    sennosides-docusate sodium  2 tablet Oral BID     PRN Meds: LORazepam, albuterol, morphine, sodium chloride flush, magnesium hydroxide, ondansetron, oxyCODONE      Intake/Output Summary (Last 24 hours) at 09/09/18 1221  Last data filed at 09/09/18 1040   Gross per 24 hour   Intake              670 ml   Output             1125 ml   Net             -455 ml       Physical Exam Performed:    BP (!) 168/83   Pulse 62   Temp 97.5 °F (36.4 °C)   Resp 18   Ht 5' 10\" (1.778 m)   Wt 134 lb 14.7 oz (61.2 kg)   SpO2 100%   BMI 19.36 kg/m²   General appearance: frail edema/atelectasis or possibly pneumonia. Small left effusion, new from prior. FL MODIFIED BARIUM SWALLOW W VIDEO   Final Result   Aspiration with thin liquid. Laryngeal penetration with nectar consistency. Please see separate speech pathology report for full discussion of findings   and recommendations. XR CHEST PORTABLE   Final Result   Low volume exam, with increasing bibasilar airspace disease as compared to   prior. XR CHEST PORTABLE   Final Result   Left basilar atelectasis or pneumonia. XR HIP LEFT (1 VIEW)   Final Result   Status post left hip arthroplasty. XR HIP LEFT (2-3 VIEWS)   Final Result   Left femoral neck fracture is redemonstrated. No significant change in   configuration is identified on the current examination. CT CERVICAL SPINE WO CONTRAST   Final Result   No acute abnormality of the cervical spine. Moderate multilevel degenerative changes. CT HEAD WO CONTRAST   Final Result   No acute intracranial abnormality. Fluid level in the right maxillary sinus could represent sinusitis. XR HIP LEFT (2-3 VIEWS)   Final Result   1. Foreshortening of the left femoral neck is suspicious for an acute   fracture.          XR CHEST PORTABLE    (Results Pending)           Assessment/Plan:    Active Hospital Problems    Diagnosis Date Noted    Atelectasis of both lungs [J98.11]     Mucus plugging of bronchi [J98.09]     Acute hypoxemic respiratory failure ([address] [J96.01] 09/06/2018    Hypovitaminosis D [E55.9] 09/06/2018    Closed fracture of neck of left femur ([address] [S72.002A] 09/04/2018    Late onset Alzheimer's disease with behavioral disturbance [G30.1, F02.81] 08/13/2018    OA (osteoarthritis) [M19.90] 08/13/2018    Essential hypertension [I10] 08/09/2018    Age-related physical debility [R54] 08/09/2018     Acute respiratory failure with hypoxia - secondary to post op, advanced age, possible HAP, possible

## 2018-09-09 NOTE — PROGRESS NOTES
the last 72 hours. Invalid input(s): AMYLASE,  ALB  PT/INR: No results for input(s): PROTIME, INR in the last 72 hours. APTT: No results for input(s): APTT in the last 72 hours. UA:No results for input(s): NITRITE, COLORU, PHUR, LABCAST, WBCUA, RBCUA, MUCUS, TRICHOMONAS, YEAST, BACTERIA, CLARITYU, SPECGRAV, LEUKOCYTESUR, UROBILINOGEN, BILIRUBINUR, BLOODU, GLUCOSEU, AMORPHOUS in the last 72 hours. Invalid input(s): KETONESU    Imaging:  I have reviewed radiology images personally. XR CHEST PORTABLE   Final Result   Mildly improved perihilar and bibasilar opacities. Small left effusion   persists. XR CHEST PORTABLE   Final Result   Increasing perihilar and bibasilar opacities. The perihilar opacities are   favored to represent pulmonary edema. The basilar opacities may represent   edema/atelectasis or possibly pneumonia. Small left effusion, new from prior. FL MODIFIED BARIUM SWALLOW W VIDEO   Final Result   Aspiration with thin liquid. Laryngeal penetration with nectar consistency. Please see separate speech pathology report for full discussion of findings   and recommendations. XR CHEST PORTABLE   Final Result   Low volume exam, with increasing bibasilar airspace disease as compared to   prior. XR CHEST PORTABLE   Final Result   Left basilar atelectasis or pneumonia. XR HIP LEFT (1 VIEW)   Final Result   Status post left hip arthroplasty. XR HIP LEFT (2-3 VIEWS)   Final Result   Left femoral neck fracture is redemonstrated. No significant change in   configuration is identified on the current examination. CT CERVICAL SPINE WO CONTRAST   Final Result   No acute abnormality of the cervical spine. Moderate multilevel degenerative changes. CT HEAD WO CONTRAST   Final Result   No acute intracranial abnormality. Fluid level in the right maxillary sinus could represent sinusitis.          XR HIP LEFT (2-3 VIEWS)   Final Result   1. Foreshortening of the left femoral neck is suspicious for an acute   fracture. XR CHEST PORTABLE    (Results Pending)     Xr Hip Left (1 View)    Result Date: 9/6/2018  EXAMINATION: SINGLE XRAY VIEW OF THE LEFT HIP 9/6/2018 12:30 am COMPARISON: 09/04/2018 HISTORY: ORDERING SYSTEM PROVIDED HISTORY: in PACU s/p left hipo hemiarthroplasty TECHNOLOGIST PROVIDED HISTORY: Reason for exam:->in PACU s/p left hipo hemiarthroplasty Ordering Physician Provided Reason for Exam: s/p left hip replacement FINDINGS: A left hip arthroplasty device is now present. Alignment is normal and there is no definite fracture. There is overlying soft tissue gas and skin staples. Status post left hip arthroplasty. Xr Hip Left (2-3 Views)    Result Date: 9/4/2018  EXAMINATION: 2 XRAY VIEWS OF THE LEFT HIP 9/4/2018 2:58 pm COMPARISON: None. HISTORY: ORDERING SYSTEM PROVIDED HISTORY: fracture TECHNOLOGIST PROVIDED HISTORY: In bucks traction please per Dr. Jeremías Villanueva Reason for exam:->fracture Ordering Physician Provided Reason for Exam: fracture of left hip, in bucks traction Acuity: Acute Type of Exam: Initial FINDINGS: Left femoral neck fracture is identified. No significant change in the configuration is seen status post traction placement. Femoroacetabular joint appears to be congruent. Left femoral neck fracture is redemonstrated. No significant change in configuration is identified on the current examination. Xr Hip Left (2-3 Views)    Result Date: 9/4/2018  EXAMINATION: 2 XRAY VIEWS OF THE LEFT HIP 9/4/2018 2:05 pm COMPARISON: None. HISTORY: ORDERING SYSTEM PROVIDED HISTORY: hip pain TECHNOLOGIST PROVIDED HISTORY: Reason for exam:->hip pain Ordering Physician Provided Reason for Exam: left hip pain Acuity: Acute Type of Exam: Initial Mechanism of Injury: fall FINDINGS: There is foreshortening of the left femoral neck suspicious for an acute femoral neck fracture. There is no dislocation.   The bones are demineralized. There is moderate bilateral hip osteoarthritis. Vascular calcifications are noted. 1. Foreshortening of the left femoral neck is suspicious for an acute fracture. Ct Head Wo Contrast    Result Date: 9/4/2018  EXAMINATION: CT OF THE HEAD WITHOUT CONTRAST  9/4/2018 1:56 pm TECHNIQUE: CT of the head was performed without the administration of intravenous contrast. Dose modulation, iterative reconstruction, and/or weight based adjustment of the mA/kV was utilized to reduce the radiation dose to as low as reasonably achievable. COMPARISON: None. HISTORY: ORDERING SYSTEM PROVIDED HISTORY: head trauma TECHNOLOGIST PROVIDED HISTORY: Has a \"code stroke\" or \"stroke alert\" been called? ->No Ordering Physician Provided Reason for Exam: fell in bathroom. hit back side of head, fx hip, no LOC per family Acuity: Acute Type of Exam: Initial Mechanism of Injury: dementia, FINDINGS: BRAIN/VENTRICLES: There is no acute intracranial hemorrhage, mass effect or midline shift. No abnormal extra-axial fluid collection. The gray-white differentiation is maintained without evidence of an acute infarct. There is no evidence of hydrocephalus. Moderate periventricular and subcortical white matter hypoattenuation, likely due to small vessel ischemic disease. Mild atrophic changes. ORBITS: The visualized portion of the orbits demonstrate no acute abnormality. SINUSES: Fluid level in the right maxillary sinus. SOFT TISSUES/SKULL:  No acute abnormality of the visualized skull or soft tissues. No acute intracranial abnormality. Fluid level in the right maxillary sinus could represent sinusitis. Ct Cervical Spine Wo Contrast    Result Date: 9/4/2018  EXAMINATION: CT OF THE CERVICAL SPINE WITHOUT CONTRAST 9/4/2018 2:06 pm TECHNIQUE: CT of the cervical spine was performed without the administration of intravenous contrast. Multiplanar reformatted images are provided for review.  Dose modulation, iterative the middle lobe. There is retrocardiac left basilar airspace disease. The heart size is mildly to moderately enlarged. There is no large pleural effusion or definite evidence for pneumothorax. Left basilar atelectasis or pneumonia. Fl Modified Barium Swallow W Video    Result Date: 9/7/2018  EXAMINATION: MODIFIED BARIUM SWALLOW WAS PERFORMED IN CONJUNCTION WITH SPEECH PATHOLOGY SERVICES TECHNIQUE: Fluoroscopic evaluation of the swallowing mechanism was performed with multiple consistency of barium product. FLUOROSCOPY DOSE AND TYPE OR TIME AND EXPOSURES: 1.6 minutes fluoroscopy, 2 fluoroscopic images COMPARISON: None HISTORY: ORDERING SYSTEM PROVIDED HISTORY: aspiration TECHNOLOGIST PROVIDED HISTORY: Reason for exam:->aspiration Ordering Physician Provided Reason for Exam: 1.6 mins of fluoro FINDINGS: Aspiration was seen with thin liquid. Deep laryngeal penetration was seen with nectar consistency. Patient tolerated additional consistencies. Intermittent vallecular pooling was seen. Aspiration with thin liquid. Laryngeal penetration with nectar consistency. Please see separate speech pathology report for full discussion of findings and recommendations. Assessment and plan:  Acute hypoxemic respiratory failure ([address] CXR 9/6 with likely bibasilar atelectasis/mucus plugging. Was on oxygen at 14 LPM, now on high flow cannula weaned down to [address] likely be weaned further. ?  COPD exacerbation. Change steroids to oral.  Mucus plugging. Status post therapeutic aspiration 9/6, cultures negative so far. Continue aggressive pulmonary toilet. Closed fracture of neck of left femur ([address] Status post hemiarthroplasty. Anemia. Probably from hip fracture and surgery. Stable. Essential hypertension, DJD, Alzheimer's disease, hypovitaminosis D. Per IM.         Electronically signed by:  Alberto Sen MD    9/9/2018    4:22 PM.

## 2018-09-09 NOTE — PROGRESS NOTES
Physical Therapy  Facility/Department: [facility] C4 PCU  Daily Treatment Note  NAME: Nathaly Dawson  : 1924  MRN: [id number]    Date of Service: 2018    Discharge Recommendations:  Subacute/Skilled Nursing Facility   PT Equipment Recommendations  Equipment Needed: No    Patient Diagnosis(es): The primary encounter diagnosis was Closed fracture of neck of left femur, initial encounter ([facility] [address] Diagnoses of Closed head injury, initial encounter and Fall from standing, initial encounter were also pertinent to this visit. has a past medical history of Allergic rhinitis; Dementia; Hypertension; and OA (osteoarthritis). has a past surgical history that includes Appendectomy; Prostatectomy (); pr office/outpt visit,procedure only (Left, 2018); and pr [facility] incl fluor gdnce dx w/cell washg spx (N/A, 2018).     Restrictions  Restrictions/Precautions  Restrictions/Precautions: Weight Bearing, Fall Risk  Lower Extremity Weight Bearing Restrictions  Left Lower Extremity Weight Bearing: Weight Bearing As Tolerated  Position Activity Restriction  Hip Precautions: Posterior hip precautions, No ADduction, No hip internal rotation, No hip flexion > 90 degrees  Other position/activity restrictions: 5LO2  Subjective   General  Referring Practitioner: Kelli Dominguez MD  Subjective  Subjective: Pt was supine unable to verbally consent ; pt pleasantly confused   Pain Assessment  Pain Assessment: Advanced Dementia       Orientation  Orientation  Overall Orientation Status: Impaired  Objective   Bed mobility  Supine to Sit: Moderate assistance  Scooting: Maximal assistance  Transfers  Sit to Stand: Dependent/Total  Stand to sit: Dependent/Total  Bed to Chair: Dependent/Total  Stand Pivot Transfers: Dependent/Total  Ambulation  Ambulation?: No (unsafe at this time. )     Balance  Sitting - Static: Fair  Sitting - Dynamic: Fair  Standing - Static: Poor  Standing - Dynamic: Poor  Exercises  Comments: sat EOB with

## 2018-09-09 NOTE — PLAN OF CARE
Problem: Falls - Risk of:  Goal: Will remain free from falls  Will remain free from falls       Outcome: Ongoing  Pt is free of falls at this time. Bed is in the lowest position, wheels locked, side rails up x 2, call light, and personal belongings within reach. Avasyt in use and patient remains in restraint. Will continue to monitor. Problem: Confusion - Acute:  Goal: Mental status will be restored to baseline  Mental status will be restored to baseline      Outcome: Not Met This Shift  Patient remains confused and agitated, remains on restraint d/t attempting to remove telebox, gown, getting delmy bed. Ativan provided as needed. Will cont' to monitor    Problem: Injury - Risk of, Physical Injury:  Goal: Will remain free from falls  Will remain free from falls       Outcome: Ongoing  Pt is free of falls at this time. Bed is in the lowest position, wheels locked, side rails up x 2, call light, and personal belongings within reach. Avasyt in use and patient remains in restraint. Will continue to monitor.

## 2018-09-10 ENCOUNTER — APPOINTMENT (OUTPATIENT)
Dept: GENERAL RADIOLOGY | Age: 83
DRG: 469 | End: 2018-09-10
Payer: MEDICARE

## 2018-09-10 LAB
ANION GAP SERPL CALCULATED.3IONS-SCNC: 11 MMOL/L (ref 3–16)
BUN BLDV-MCNC: 23 MG/DL (ref 7–20)
CALCIUM SERPL-MCNC: 8.5 MG/DL (ref 8.3–10.6)
CHLORIDE BLD-SCNC: 102 MMOL/L (ref 99–110)
CO2: 30 MMOL/L (ref 21–32)
CREAT SERPL-MCNC: 1.1 MG/DL (ref 0.8–1.3)
GFR AFRICAN AMERICAN: >60
GFR NON-AFRICAN AMERICAN: >60
GLUCOSE BLD-MCNC: 103 MG/DL (ref 70–99)
HCT VFR BLD CALC: 32.2 % (ref 40.5–52.5)
HEMOGLOBIN: 10.9 G/DL (ref 13.5–17.5)
MCH RBC QN AUTO: 31 PG (ref 26–34)
MCHC RBC AUTO-ENTMCNC: 33.7 G/DL (ref 31–36)
MCV RBC AUTO: 91.9 FL (ref 80–100)
PDW BLD-RTO: 14.4 % (ref 12.4–15.4)
PLATELET # BLD: 200 K/UL (ref 135–450)
PMV BLD AUTO: 7.9 FL (ref 5–10.5)
POTASSIUM SERPL-SCNC: 3.1 MMOL/L (ref 3.5–5.1)
RBC # BLD: 3.5 M/UL (ref 4.2–5.9)
SODIUM BLD-SCNC: 143 MMOL/L (ref 136–145)
WBC # BLD: 7.7 K/UL (ref 4–11)

## 2018-09-10 PROCEDURE — 2580000003 HC RX 258: Performed by: HOSPITALIST

## 2018-09-10 PROCEDURE — 6370000000 HC RX 637 (ALT 250 FOR IP): Performed by: REGISTERED NURSE

## 2018-09-10 PROCEDURE — 71045 X-RAY EXAM CHEST 1 VIEW: CPT

## 2018-09-10 PROCEDURE — 97110 THERAPEUTIC EXERCISES: CPT

## 2018-09-10 PROCEDURE — 92526 ORAL FUNCTION THERAPY: CPT

## 2018-09-10 PROCEDURE — 6360000002 HC RX W HCPCS: Performed by: INTERNAL MEDICINE

## 2018-09-10 PROCEDURE — 2580000003 HC RX 258: Performed by: INTERNAL MEDICINE

## 2018-09-10 PROCEDURE — 6370000000 HC RX 637 (ALT 250 FOR IP): Performed by: INTERNAL MEDICINE

## 2018-09-10 PROCEDURE — G8978 MOBILITY CURRENT STATUS: HCPCS

## 2018-09-10 PROCEDURE — 2700000000 HC OXYGEN THERAPY PER DAY

## 2018-09-10 PROCEDURE — 85027 COMPLETE CBC AUTOMATED: CPT

## 2018-09-10 PROCEDURE — 80048 BASIC METABOLIC PNL TOTAL CA: CPT

## 2018-09-10 PROCEDURE — 6370000000 HC RX 637 (ALT 250 FOR IP): Performed by: HOSPITALIST

## 2018-09-10 PROCEDURE — 97530 THERAPEUTIC ACTIVITIES: CPT

## 2018-09-10 PROCEDURE — 6360000002 HC RX W HCPCS: Performed by: HOSPITALIST

## 2018-09-10 PROCEDURE — 94761 N-INVAS EAR/PLS OXIMETRY MLT: CPT

## 2018-09-10 PROCEDURE — 36415 COLL VENOUS BLD VENIPUNCTURE: CPT

## 2018-09-10 PROCEDURE — 2060000000 HC ICU INTERMEDIATE R&B

## 2018-09-10 PROCEDURE — 99232 SBSQ HOSP IP/OBS MODERATE 35: CPT | Performed by: INTERNAL MEDICINE

## 2018-09-10 RX ORDER — POTASSIUM CHLORIDE 750 MG/1
40 TABLET, FILM COATED, EXTENDED RELEASE ORAL ONCE
Status: COMPLETED | OUTPATIENT
Start: 2018-09-10 | End: 2018-09-10

## 2018-09-10 RX ADMIN — SODIUM CHLORIDE, PRESERVATIVE FREE 10 ML: 5 INJECTION INTRAVENOUS at 11:20

## 2018-09-10 RX ADMIN — MIRTAZAPINE 15 MG: 15 TABLET, FILM COATED ORAL at 22:55

## 2018-09-10 RX ADMIN — LORAZEPAM 0.5 MG: 2 INJECTION, SOLUTION INTRAMUSCULAR; INTRAVENOUS at 00:10

## 2018-09-10 RX ADMIN — PIPERACILLIN AND TAZOBACTAM 3.38 G: 3; .375 INJECTION, POWDER, FOR SOLUTION INTRAVENOUS at 15:40

## 2018-09-10 RX ADMIN — ACETAMINOPHEN 1000 MG: 500 TABLET ORAL at 11:19

## 2018-09-10 RX ADMIN — PREDNISONE 30 MG: 10 TABLET ORAL at 11:18

## 2018-09-10 RX ADMIN — SENNOSIDES AND DOCUSATE SODIUM 2 TABLET: 8.6; 5 TABLET ORAL at 22:55

## 2018-09-10 RX ADMIN — DONEPEZIL HYDROCHLORIDE 5 MG: 5 TABLET, FILM COATED ORAL at 22:55

## 2018-09-10 RX ADMIN — PIPERACILLIN AND TAZOBACTAM 3.38 G: 3; .375 INJECTION, POWDER, FOR SOLUTION INTRAVENOUS at 22:55

## 2018-09-10 RX ADMIN — ACETAMINOPHEN 1000 MG: 500 TABLET ORAL at 22:55

## 2018-09-10 RX ADMIN — SODIUM CHLORIDE, PRESERVATIVE FREE 10 ML: 5 INJECTION INTRAVENOUS at 22:56

## 2018-09-10 RX ADMIN — PIPERACILLIN AND TAZOBACTAM 3.38 G: 3; .375 INJECTION, POWDER, FOR SOLUTION INTRAVENOUS at 05:08

## 2018-09-10 RX ADMIN — SENNOSIDES AND DOCUSATE SODIUM 2 TABLET: 8.6; 5 TABLET ORAL at 11:19

## 2018-09-10 RX ADMIN — ENOXAPARIN SODIUM 40 MG: 40 INJECTION SUBCUTANEOUS at 11:20

## 2018-09-10 RX ADMIN — POTASSIUM CHLORIDE 40 MEQ: 750 TABLET, EXTENDED RELEASE ORAL at 11:18

## 2018-09-10 RX ADMIN — AMLODIPINE BESYLATE 2.5 MG: 2.5 TABLET ORAL at 11:19

## 2018-09-10 ASSESSMENT — PAIN SCALES - GENERAL
PAINLEVEL_OUTOF10: 5
PAINLEVEL_OUTOF10: 0

## 2018-09-10 NOTE — PROGRESS NOTES
Speech Language Pathology  Facility/Department: [facility] C4 PCU  Dysphagia Daily Treatment Note    NAME: Jay Rubin  : 1924  MRN: [id number]    Patient Diagnosis(es):   Patient Active Problem List    Diagnosis Date Noted    Atelectasis of both lungs     Mucus plugging of bronchi     Acute hypoxemic respiratory failure ([facility] [address] 2018    Hypovitaminosis D 2018    Closed fracture of neck of left femur ([facility] [address] 2018    OA (osteoarthritis) 2018    Late onset Alzheimer's disease with behavioral disturbance 2018    Essential hypertension 2018    History of recent fall 2018    Age-related physical debility 2018     Allergies: Allergies   Allergen Reactions    Claritin [Loratadine]      Subjective: Pt alert and agreeable to PO trials over breakfast with SLP. Nasal cannula in place. Pain: No report of pain    Current Diet: Dental Soft / HTL    Diet Tolerance:  Patient tolerating current diet level without signs/symptoms of penetration / aspiration. P.O. Trials: Thin    n/a   Nectar    n/a   Honey   x Several sips HT coffee, OJ, mild   Puree    n/a   Solid   x Oatmeal, pancakes, ground sausage     Dysphagia Therapy:  Pt seen at bedside, repositioned to upright. Pt's breakfast tray had just arrived, and SLP assisted pt with meal prep, including cutting pancakes into small bites. This date, pt attempted to eat whole pancake from fork without cutting into pieces. Pt was able to feed himself, once food tray had been prepped, however needed some encouragement. Occasional wet, delayed cough noted across all trials. Pt confused throughout, attempting to get out of bed; re-direction was effective for less than 1 minute. Pt appeared to tolerate sips of HTL, oatmeal, small bites of pancake and sausage, with no immediate cough, however intermittent cough noted throughout. Unable to read O2 meter in room due to pt constantly moving.    Recommend downgrade to Dysphagia 2, unless pt is able to have PCA/staff assist with tray prep, specifically cutting up large food items into smaller bites. CXR dated 09/10/2018: Impression   No acute interval change.         Goals:    Long Term:   Timeframe for Long-term Goals: 1 week  Goal 1: Pt will tolerate least restrictive and safest PO diet without clinical signs of aspiration. 09/10 ongoing; pt with intermittent delayed, wet cough; stable CXR     Short Term:  Goal 1: Pt will tolerate instrumental swallowing procedure to determine least restrictive and safest PO diet. 09/07 GOAL MET  Goal 2: Pt will tolerate HTL in 10/10 trials with no clinical signs of aspiration. 09/10 ongoing; pt with intermittent delayed, wet cough; stable CXR  Goal 3: Pt will tolerate dental soft foods in 10/10 trials with no clinical signs of aspiration. 09/10 due to trying to take entire pancake into mouth without cutting first, recommend downgrade to Dysphagia2   Goal 4: Pt will complete pharyngeal strengthening exercises in 10/10 with mod cues. (attempt, if pt is able to follow directions) 09/10 pt unable to follow directions well this date. Recommendations:  Downgrade to Dysphagia 2, unless pt has full feed assist with all meals  Honey Thick liquids    Patient/Family/Caregiver Education:  Reviewed recommendations, no evidence of learning    Compensatory Strategies:  · Upright for all oral intake  · Small bites/sips  · Eat slowly  · Alternate solids/liquids  · Oral care     Plan:    Continued Dysphagia treatment with goals per plan of care. Discharge Recommendations:  LTC or 24-hr supervision    If pt discharges from hospital prior to Speech/Swallowing discharge, this note serves as tx and discharge summary.      Total Treatment Time:   0815-43, 28 min, Dysphagia Tx    Ras Castillo M.A., [facility], [address] [place] [address],[address]  Speech Language Pathologist

## 2018-09-10 NOTE — PROGRESS NOTES
Pt was up to the chair for breakfast and lunch today and then pt became very restless and unable to redirect. Safety alarm and visual monitor in place and active. Pt was assisted back to bed and ativan was given. Pt slept for about two hours and then woke to his grand daughter visiting. She fed him dinner and reminisced with him. He laughed and he cried. Bedside report given. Pt repositioned and settled down for rest. Pt appeared to be calm and cooperative.

## 2018-09-10 NOTE — PROGRESS NOTES
male sitting up in chair NAD, cooperaterive   HEENT:  Normal cephalic, atraumatic without obvious deformity. Pupils equal, round, and reactive to light. Extra ocular muscles intact. Conjunctivae/corneas clear. Neck: Supple, with full range of motion. No jugular venous distention. Trachea midline. Respiratory: Even and easy, decreased bilaterally. Currently on 6-8 liters per NC  Cardiovascular:  Regular rate and rhythm with normal S1/S2 without murmurs, rubs or gallops. Abdomen: Soft, non-tender, non-distended with normal bowel sounds. Musculoskeletal:  No clubbing, cyanosis or edema bilaterally. Left Hip dressing. CSM intact   Skin:  pale warm and dry. No rashes or lesions. Scattered bruising   Neurologic:  Neurovascularly intact without any focal sensory/motor deficits. Psychiatric:  defer  Capillary Refill: Brisk,< 3 seconds   Peripheral Pulses: +2 palpable, equal bilaterally     Labs:   Recent Labs      09/08/18   0459  09/09/18   0445  09/10/18   0454   WBC  8.7  7.3  7.7   HGB  10.9*  10.4*  10.9*   HCT  31.7*  30.5*  32.2*   PLT  181  188  200     Recent Labs      09/08/18   0459  09/09/18   0445  09/10/18   0454   NA  141  144  143   K  3.7  3.5  3.1*   CL  104  106  102   CO2  25  28  30   BUN  19  19  23*   CREATININE  0.7*  0.7*  1.1   CALCIUM  8.6  8.2*  8.5     Radiology:  XR CHEST PORTABLE   Final Result   No acute interval change. XR CHEST PORTABLE   Final Result   Mildly improved perihilar and bibasilar opacities. Small left effusion   persists. XR CHEST PORTABLE   Final Result   Increasing perihilar and bibasilar opacities. The perihilar opacities are   favored to represent pulmonary edema. The basilar opacities may represent   edema/atelectasis or possibly pneumonia. Small left effusion, new from prior. FL MODIFIED BARIUM SWALLOW W VIDEO   Final Result   Aspiration with thin liquid. Laryngeal penetration with nectar consistency.       Please see separate

## 2018-09-10 NOTE — PROGRESS NOTES
the right maxillary sinus could represent sinusitis. XR HIP LEFT (2-3 VIEWS)   Final Result   1. Foreshortening of the left femoral neck is suspicious for an acute   fracture. XR CHEST PORTABLE    (Results Pending)     Xr Hip Left (1 View)    Result Date: 9/6/2018  EXAMINATION: SINGLE XRAY VIEW OF THE LEFT HIP 9/6/2018 12:30 am COMPARISON: 09/04/2018 HISTORY: ORDERING SYSTEM PROVIDED HISTORY: in PACU s/p left hipo hemiarthroplasty TECHNOLOGIST PROVIDED HISTORY: Reason for exam:->in PACU s/p left hipo hemiarthroplasty Ordering Physician Provided Reason for Exam: s/p left hip replacement FINDINGS: A left hip arthroplasty device is now present. Alignment is normal and there is no definite fracture. There is overlying soft tissue gas and skin staples. Status post left hip arthroplasty. Xr Hip Left (2-3 Views)    Result Date: 9/4/2018  EXAMINATION: 2 XRAY VIEWS OF THE LEFT HIP 9/4/2018 2:58 pm COMPARISON: None. HISTORY: ORDERING SYSTEM PROVIDED HISTORY: fracture TECHNOLOGIST PROVIDED HISTORY: In bucks traction please per Dr. Ana Hurley Reason for exam:->fracture Ordering Physician Provided Reason for Exam: fracture of left hip, in bucks traction Acuity: Acute Type of Exam: Initial FINDINGS: Left femoral neck fracture is identified. No significant change in the configuration is seen status post traction placement. Femoroacetabular joint appears to be congruent. Left femoral neck fracture is redemonstrated. No significant change in configuration is identified on the current examination. Xr Hip Left (2-3 Views)    Result Date: 9/4/2018  EXAMINATION: 2 XRAY VIEWS OF THE LEFT HIP 9/4/2018 2:05 pm COMPARISON: None.  HISTORY: ORDERING SYSTEM PROVIDED HISTORY: hip pain TECHNOLOGIST PROVIDED HISTORY: Reason for exam:->hip pain Ordering Physician Provided Reason for Exam: left hip pain Acuity: Acute Type of Exam: Initial Mechanism of Injury: fall FINDINGS: There is foreshortening of the left femoral neck suspicious for an acute femoral neck fracture. There is no dislocation. The bones are demineralized. There is moderate bilateral hip osteoarthritis. Vascular calcifications are noted. 1. Foreshortening of the left femoral neck is suspicious for an acute fracture. Ct Head Wo Contrast    Result Date: 9/4/2018  EXAMINATION: CT OF THE HEAD WITHOUT CONTRAST  9/4/2018 1:56 pm TECHNIQUE: CT of the head was performed without the administration of intravenous contrast. Dose modulation, iterative reconstruction, and/or weight based adjustment of the mA/kV was utilized to reduce the radiation dose to as low as reasonably achievable. COMPARISON: None. HISTORY: ORDERING SYSTEM PROVIDED HISTORY: head trauma TECHNOLOGIST PROVIDED HISTORY: Has a \"code stroke\" or \"stroke alert\" been called? ->No Ordering Physician Provided Reason for Exam: fell in bathroom. hit back side of head, fx hip, no LOC per family Acuity: Acute Type of Exam: Initial Mechanism of Injury: dementia, FINDINGS: BRAIN/VENTRICLES: There is no acute intracranial hemorrhage, mass effect or midline shift. No abnormal extra-axial fluid collection. The gray-white differentiation is maintained without evidence of an acute infarct. There is no evidence of hydrocephalus. Moderate periventricular and subcortical white matter hypoattenuation, likely due to small vessel ischemic disease. Mild atrophic changes. ORBITS: The visualized portion of the orbits demonstrate no acute abnormality. SINUSES: Fluid level in the right maxillary sinus. SOFT TISSUES/SKULL:  No acute abnormality of the visualized skull or soft tissues. No acute intracranial abnormality. Fluid level in the right maxillary sinus could represent sinusitis.      Ct Cervical Spine Wo Contrast    Result Date: 9/4/2018  EXAMINATION: CT OF THE CERVICAL SPINE WITHOUT CONTRAST 9/4/2018 2:06 pm TECHNIQUE: CT of the cervical spine was performed without the administration of intravenous contrast.

## 2018-09-10 NOTE — PROGRESS NOTES
walker; pt unable to support body weight due to extreme posterior and R lateral lean; cues for foot placement.)  Ambulation  Ambulation?: No     Exercises  Heelslides: x 10 BLE; AAROM on L side for 1st 5 reps  Knee Long Arc Quad: x5 BLE   Ankle Pumps: x10 in bed, x 10 seated       Assessment   Body structures, Functions, Activity limitations: Decreased functional mobility ; Decreased strength;Decreased cognition;Decreased endurance;Decreased balance  Assessment: Pt demonstrating improved tolerance for functional mobility today but still requiring significant assistance for all OOB tasks. Able to perform stand pivot transfer with mod A x 2 using standard walker, therapist providing assist for balance and max cuing for foot placement. Pt will benefit from continued skilled therapy for strength, balance, and activity tolerance to promote increased (I) with functional mobility. Treatment Diagnosis: impaired functional mobility  Specific instructions for Next Treatment: progress transfers and ambulation as able. Prognosis: Good  Patient Education: Educated on role of PT, use of call light and safety with mobility, hip precautions  REQUIRES PT FOLLOW UP: Yes  Activity Tolerance  Activity Tolerance: Patient Tolerated treatment well;Patient limited by endurance     G-Code  PT G-Codes  Functional Assessment Tool Used: -PAC  Score: 10  Functional Limitation: Mobility: Walking and moving around  Mobility: Walking and Moving Around Current Status ():  At least 60 percent but less than 80 percent impaired, limited or restricted     AM-PAC Score  AM-PAC Inpatient Mobility Raw Score : 10  -PAC Inpatient T-Scale Score : 32.29  Mobility Inpatient CMS 0-100% Score: 76.75  Mobility Inpatient CMS G-Code Modifier : CL     Goals  Short term goals  Time Frame for Short term goals: 5 days  Short term goal 1: Pt will perform bed mobility with mod A - PARTIALLY MET 9/10/18  Short term goal 2: Pt will perform sit-stand transfers with mod A  Short term goal 3: Pt will ambulate 13' with walker and mod A  Short term goal 4: By 9/09/18, pt will tolerate 15 reps of LE ther ex for improved strength and activity tolerance - MET 9/10/18  Patient Goals   Patient goals : pt unable to state goal    Plan    Plan  Times per week: 7x/wk  Times per day: Daily  Specific instructions for Next Treatment: progress transfers and ambulation as able. Current Treatment Recommendations: Strengthening, Balance Training, Transfer Training, Functional Mobility Training, Gait Training, Patient/Caregiver Education & Training, Home Exercise Program  Safety Devices  Type of devices:  All fall risk precautions in place, Gait belt, Patient at risk for falls, Nurse notified, Chair alarm in place, Left in chair, Call light within reach     Therapy Time   Individual Concurrent Group Co-treatment   Time In [address] [place] [address]         Minutes 24         Timed Code Treatment Minutes: 24 Minutes     Vitor Salter SPT  Supervised by Arnulfo Negrete, PT

## 2018-09-10 NOTE — PROGRESS NOTES
awareness of deficits  Initiation: Requires cues for some  Sequencing: Requires cues for some      Type of ROM/Therapeutic Exercise  Type of ROM/Therapeutic Exercise: AROM  Exercises  Shoulder Elevation: x 10 reps  Shoulder Flexion: x 10 reps  Elbow Flexion: x 10 reps  Elbow Extension: x 10 reps  Finger Flexion: x 10 reps  Finger Extension: x 10 reps      Assessment   Performance deficits / Impairments: Decreased functional mobility ; Decreased ADL status; Decreased safe awareness;Decreased cognition;Decreased balance;Decreased endurance  Assessment: Patient modA of 2 for functional pivot transfers bed to chair with SW, BUE AROM exercises completed to increase strength for ADLs/transfers. Patient Education: role of OT, bed mobility, ADLs,functional transfers, DC recommendations  REQUIRES OT FOLLOW UP: Yes  Activity Tolerance  Activity Tolerance: Patient Tolerated treatment well  Activity Tolerance: O2 sats 100% at end of session on 3L  Safety Devices  Safety Devices in place: Yes  Type of devices: Left in chair;Call light within reach;Nurse notified; Chair alarm in place;Gait belt         Plan   Plan  Times per week: 4-6x/wk  Current Treatment Recommendations: Self-Care / ADL, Balance Training, Endurance Training, Functional Mobility Training, Safety Education & Training                 AM-PAC Score        AM-[facility] Inpatient Daily Activity Raw Score: 10  AM-PAC Inpatient ADL T-Scale Score : 27.31  ADL Inpatient CMS 0-100% Score: 74.7  ADL Inpatient CMS G-Code Modifier : CL    Goals  Short term goals  Time Frame for Short term goals: for 1 week  Short term goal 1: Perform grooming with SBA  Short term goal 2: Perform functional transfers with mod x2 by 9/16-STG met 9/10 UPGRADE Pt will complete functional transfers/BSC transfers with Marisa of 2  Short term goal 3: Perform LE dressing with mod A for pants  Patient Goals   Patient goals : Pt did not state       Therapy Time   Individual Concurrent Group Co-treatment   Time

## 2018-09-10 NOTE — PROGRESS NOTES
Department of Orthopedic Surgery  Nurse Practitioner   Progress Note    Subjective:     Post-Operative Day: 5 Status Post left Hip Hemiarthroplasty  Systemic or Specific Complaints:  None today, lying comfortably in bed without restraints in place. AVASYS in place. No family at bedside. Objective:     Patient Vitals for the past 24 hrs:   BP Temp Temp src Pulse Resp SpO2 Weight   09/10/18 0507 (!) 152/73 97.4 °F (36.3 °C) Oral 66 18 96 % -   09/10/18 0422 - - - - - - 133 lb 9.6 oz (60.6 kg)   09/10/18 0127 (!) 153/77 97.6 °F (36.4 °C) Axillary 61 18 (!) 89 % -   09/09/18 2240 (!) 165/92 97.8 °F (36.6 °C) Oral 57 18 90 % -   09/09/18 1830 (!) 156/85 97.4 °F (36.3 °C) - 70 18 98 % -   09/09/18 1500 (!) 157/101 - - - - - -   09/09/18 1321 (!) 147/66 98.2 °F (36.8 °C) - 75 18 98 % -       General: alert, appears stated age, cooperative and no distress   Wound: Wound clean and dry no evidence of infection. No dressing in place, incision CDI. Motion: Painful range of Motion   DVT Exam: No evidence of DVT seen on physical exam.       NVI in lower extremity. Thigh swollen but soft. Moving foot and ankle. Ecchymosis noted to hip.  mepilex dressing in place. Data Review  CBC:   Lab Results   Component Value Date    WBC 7.7 09/10/2018    RBC 3.50 09/10/2018    HGB 10.9 09/10/2018    HCT 32.2 09/10/2018     09/10/2018       Assessment:     Status Post left Hip Hemiathroplasty. Doing well postoperatively. Plan:      1: Continues current post-op course : Continue current plan of care per IM. Post-op labs reviewed and stable. Pulm following. 2:  Continue Deep venous thrombosis prophylaxis - Lovenox  3:  Continue physical therapy, OT, WBAT  4:  Continue Pain Control PRN  5:  Patient will need SNF at discharge. Discharge pending medical stability and resolution of pulm issues. Pt stable from ortho standpoint. Instructions placed, will sign off.   Discussed with DOMINIK Mtz PA-C

## 2018-09-11 ENCOUNTER — APPOINTMENT (OUTPATIENT)
Dept: GENERAL RADIOLOGY | Age: 83
DRG: 469 | End: 2018-09-11
Payer: MEDICARE

## 2018-09-11 VITALS
WEIGHT: 127.65 LBS | RESPIRATION RATE: 16 BRPM | DIASTOLIC BLOOD PRESSURE: 67 MMHG | OXYGEN SATURATION: 97 % | TEMPERATURE: 98.2 F | HEIGHT: 70 IN | SYSTOLIC BLOOD PRESSURE: 129 MMHG | HEART RATE: 70 BPM | BODY MASS INDEX: 18.27 KG/M2

## 2018-09-11 LAB
ANION GAP SERPL CALCULATED.3IONS-SCNC: 10 MMOL/L (ref 3–16)
BUN BLDV-MCNC: 21 MG/DL (ref 7–20)
CALCIUM SERPL-MCNC: 8.4 MG/DL (ref 8.3–10.6)
CHLORIDE BLD-SCNC: 104 MMOL/L (ref 99–110)
CO2: 31 MMOL/L (ref 21–32)
CREAT SERPL-MCNC: 0.9 MG/DL (ref 0.8–1.3)
GFR AFRICAN AMERICAN: >60
GFR NON-AFRICAN AMERICAN: >60
GLUCOSE BLD-MCNC: 124 MG/DL (ref 70–99)
HCT VFR BLD CALC: 30.9 % (ref 40.5–52.5)
HEMOGLOBIN: 10.5 G/DL (ref 13.5–17.5)
MCH RBC QN AUTO: 31 PG (ref 26–34)
MCHC RBC AUTO-ENTMCNC: 33.9 G/DL (ref 31–36)
MCV RBC AUTO: 91.4 FL (ref 80–100)
PDW BLD-RTO: 14.1 % (ref 12.4–15.4)
PLATELET # BLD: 228 K/UL (ref 135–450)
PMV BLD AUTO: 7.5 FL (ref 5–10.5)
POTASSIUM SERPL-SCNC: 3.3 MMOL/L (ref 3.5–5.1)
RBC # BLD: 3.38 M/UL (ref 4.2–5.9)
SODIUM BLD-SCNC: 145 MMOL/L (ref 136–145)
WBC # BLD: 9.5 K/UL (ref 4–11)

## 2018-09-11 PROCEDURE — 80048 BASIC METABOLIC PNL TOTAL CA: CPT

## 2018-09-11 PROCEDURE — 6370000000 HC RX 637 (ALT 250 FOR IP): Performed by: HOSPITALIST

## 2018-09-11 PROCEDURE — 2580000003 HC RX 258: Performed by: HOSPITALIST

## 2018-09-11 PROCEDURE — 71045 X-RAY EXAM CHEST 1 VIEW: CPT

## 2018-09-11 PROCEDURE — 36415 COLL VENOUS BLD VENIPUNCTURE: CPT

## 2018-09-11 PROCEDURE — 99231 SBSQ HOSP IP/OBS SF/LOW 25: CPT | Performed by: INTERNAL MEDICINE

## 2018-09-11 PROCEDURE — 97535 SELF CARE MNGMENT TRAINING: CPT

## 2018-09-11 PROCEDURE — 92526 ORAL FUNCTION THERAPY: CPT

## 2018-09-11 PROCEDURE — 97110 THERAPEUTIC EXERCISES: CPT

## 2018-09-11 PROCEDURE — 85027 COMPLETE CBC AUTOMATED: CPT

## 2018-09-11 PROCEDURE — 97530 THERAPEUTIC ACTIVITIES: CPT

## 2018-09-11 PROCEDURE — 6360000002 HC RX W HCPCS: Performed by: HOSPITALIST

## 2018-09-11 PROCEDURE — 6370000000 HC RX 637 (ALT 250 FOR IP): Performed by: INTERNAL MEDICINE

## 2018-09-11 PROCEDURE — 6370000000 HC RX 637 (ALT 250 FOR IP): Performed by: REGISTERED NURSE

## 2018-09-11 RX ORDER — POTASSIUM CHLORIDE 750 MG/1
40 TABLET, FILM COATED, EXTENDED RELEASE ORAL ONCE
Status: COMPLETED | OUTPATIENT
Start: 2018-09-11 | End: 2018-09-11

## 2018-09-11 RX ORDER — OXYCODONE HYDROCHLORIDE 5 MG/1
5 TABLET ORAL EVERY 6 HOURS PRN
Qty: 28 TABLET | Refills: 0 | Status: SHIPPED | OUTPATIENT
Start: 2018-09-11 | End: 2018-09-18

## 2018-09-11 RX ORDER — PREDNISONE 10 MG/1
30 TABLET ORAL DAILY
Qty: 3 TABLET | Refills: 0 | Status: SHIPPED | OUTPATIENT
Start: 2018-09-12 | End: 2018-09-13

## 2018-09-11 RX ADMIN — ACETAMINOPHEN 1000 MG: 500 TABLET ORAL at 13:40

## 2018-09-11 RX ADMIN — OXYCODONE HYDROCHLORIDE 5 MG: 5 TABLET ORAL at 02:54

## 2018-09-11 RX ADMIN — PREDNISONE 30 MG: 10 TABLET ORAL at 08:09

## 2018-09-11 RX ADMIN — AMLODIPINE BESYLATE 2.5 MG: 2.5 TABLET ORAL at 08:09

## 2018-09-11 RX ADMIN — SODIUM CHLORIDE, PRESERVATIVE FREE 10 ML: 5 INJECTION INTRAVENOUS at 08:09

## 2018-09-11 RX ADMIN — SENNOSIDES AND DOCUSATE SODIUM 2 TABLET: 8.6; 5 TABLET ORAL at 08:09

## 2018-09-11 RX ADMIN — OXYCODONE HYDROCHLORIDE 5 MG: 5 TABLET ORAL at 08:09

## 2018-09-11 RX ADMIN — ACETAMINOPHEN 1000 MG: 500 TABLET ORAL at 08:09

## 2018-09-11 RX ADMIN — ENOXAPARIN SODIUM 40 MG: 40 INJECTION SUBCUTANEOUS at 08:09

## 2018-09-11 RX ADMIN — POTASSIUM CHLORIDE 40 MEQ: 750 TABLET, EXTENDED RELEASE ORAL at 13:40

## 2018-09-11 ASSESSMENT — PAIN DESCRIPTION - ORIENTATION
ORIENTATION: LEFT
ORIENTATION: RIGHT
ORIENTATION: RIGHT;LEFT
ORIENTATION: LEFT

## 2018-09-11 ASSESSMENT — ACTIVITIES OF DAILY LIVING (ADL)
EFFECT OF PAIN ON DAILY ACTIVITIES: DISCOMFORT

## 2018-09-11 ASSESSMENT — PAIN DESCRIPTION - LOCATION
LOCATION: HIP
LOCATION: LEG
LOCATION: LEG
LOCATION: HIP

## 2018-09-11 ASSESSMENT — PAIN DESCRIPTION - FREQUENCY
FREQUENCY: CONTINUOUS

## 2018-09-11 ASSESSMENT — PAIN SCALES - GENERAL
PAINLEVEL_OUTOF10: 6
PAINLEVEL_OUTOF10: 3
PAINLEVEL_OUTOF10: 3
PAINLEVEL_OUTOF10: 5
PAINLEVEL_OUTOF10: 3
PAINLEVEL_OUTOF10: 6
PAINLEVEL_OUTOF10: 5

## 2018-09-11 ASSESSMENT — PAIN DESCRIPTION - PROGRESSION
CLINICAL_PROGRESSION: NOT CHANGED

## 2018-09-11 ASSESSMENT — PAIN DESCRIPTION - DIRECTION
RADIATING_TOWARDS: BACK
RADIATING_TOWARDS: LEG
RADIATING_TOWARDS: LEG

## 2018-09-11 ASSESSMENT — PAIN DESCRIPTION - DESCRIPTORS
DESCRIPTORS: ACHING

## 2018-09-11 ASSESSMENT — PAIN DESCRIPTION - ONSET
ONSET: ON-GOING

## 2018-09-11 ASSESSMENT — PAIN DESCRIPTION - PAIN TYPE
TYPE: ACUTE PAIN

## 2018-09-11 NOTE — CARE COORDINATION
CASE MANAGEMENT DISCHARGE SUMMARY    DISCHARGE TO: [facility]  SKILLED     TRANSPORTATION: [facility] 4 pm      NOTIFIED: PATIENT, Pt's son Lester March, facility and RN     HENS: YES    PRECERTIFICATION OBTAINED: YES/ Floor to SNF/ EGS    REPORT: NURSE TO CALL REPORT TO: # [phone]    DORIAN/AVS Faxed to facility     NURSE RESPONSIBLE FOR COMPLETION OF   E-CONTINUITY OF CARE (dorian) FORM, and RECONCILIATION OF MEDICATIONS,  NURSE TO ENSURE THAT ANY WRITTEN PRESCRIPTIONS AND A COPY OF THE PATIENTS CHART TO ACCOMPANY THEM TO FACILITY if not able to access electronic chart.

## 2018-09-11 NOTE — PROGRESS NOTES
Occupational Therapy  Facility/Department: [facility] C4 PCU  Daily Treatment Note  NAME: Roverto Aburto  : 1924  MRN: [id number]    Date of Service: 2018    Discharge Recommendations:  Subacute/Skilled Nursing Facility  OT Equipment Recommendations  Other: defer to next level of care    Patient Diagnosis(es): The primary encounter diagnosis was Closed fracture of neck of left femur, initial encounter ([facility] [address] Diagnoses of Closed head injury, initial encounter and Fall from standing, initial encounter were also pertinent to this visit. has a past medical history of Allergic rhinitis; Dementia; Hypertension; and OA (osteoarthritis). has a past surgical history that includes Appendectomy; Prostatectomy (); pr office/outpt visit,procedure only (Left, 2018); and pr [facility] incl fluor gdnce dx w/cell washg spx (N/A, 2018). Restrictions  Restrictions/Precautions  Restrictions/Precautions: Weight Bearing, Fall Risk  Lower Extremity Weight Bearing Restrictions  Left Lower Extremity Weight Bearing: Weight Bearing As Tolerated  Position Activity Restriction  Hip Precautions: Posterior hip precautions, No ADduction, No hip internal rotation, No hip flexion > 90 degrees  Other position/activity restrictions: 5LO2  Subjective   General  Chart Reviewed: Yes  Patient assessed for rehabilitation services?: Yes  Response to previous treatment: Patient with no complaints from previous session  Family / Caregiver Present: No  Referring Practitioner: Isidro Oliver  Diagnosis: L femoral neck fx s/p L hip hemiarthroplasty 18  Subjective  Subjective: Pt supine in bed upon entry, pleasant and agreeable to OT session. General Comment  Comments: RN approved OT session. Orientation  Orientation  Overall Orientation Status: Impaired  Orientation Level: Oriented to person;Disoriented to place; Disoriented to time;Disoriented to situation  Objective    ADL  UE Bathing: Stand by assistance (and vc)  UE Dressing: demo'd improved transfers using RW stand stepping to chair with mod assist x 2. Continue OT per POC. Prognosis: Good  Patient Education: role of OT, bed mobility, ADLs,functional transfers, DC recommendations  REQUIRES OT FOLLOW UP: Yes  Activity Tolerance  Activity Tolerance: Patient Tolerated treatment well  Activity Tolerance: 97% O2 sat, 118/81 BP, 94 BPM  Safety Devices  Safety Devices in place: Yes  Type of devices: Left in chair;Call light within reach;Nurse notified; Chair alarm in place          Plan   Plan  Times per week: 4-6x/wk  Current Treatment Recommendations: Self-Care / ADL, Balance Training, Endurance Training, Functional Mobility Training, Safety Education & Training    AM-PAC Score        AM-PAC Inpatient Daily Activity Raw Score: 12  AM-PAC Inpatient ADL T-Scale Score : 30.6  ADL Inpatient CMS 0-100% Score: 66.57  ADL Inpatient CMS G-Code Modifier : CL    Goals  Short term goals  Time Frame for Short term goals: for 1 week  Short term goal 1: Perform grooming with SBA  Short term goal 2: Perform functional transfers with mod x2 by 9/16-STG met 9/10 UPGRADE Pt will complete functional transfers/BSC transfers with Marisa of 2  Short term goal 3: Perform LE dressing with mod A for pants  Patient Goals   Patient goals : Pt did not state       Therapy Time   Individual Concurrent Group Co-treatment   Time In       0857   Time Out       0935   Minutes       38   Timed Code Treatment Minutes: [address], OTR/L  #[id number]

## 2018-09-11 NOTE — PLAN OF CARE
Problem: Falls - Risk of:  Goal: Will remain free from falls  Will remain free from falls       Outcome: Met This Shift  Pt free of falls at this time. Bed in lowest locked position. Bedside table and call light within reach. Bed alarm on. Avasys in place. Will continue to monitor. Problem: Pain:  Goal: Pain level will decrease  Pain level will decrease   Outcome: Ongoing  Pt c/o pain in R leg 5/10. When asked if he wants pain medication pt states \"no, let's just see what happens\"    Problem: Risk for Impaired Skin Integrity  Goal: Tissue integrity - skin and mucous membranes  Structural intactness and normal physiological function of skin and  mucous membranes. Outcome: Ongoing  Pt being turned q2hrs. Dressing changed on L hip. Site clean, dry and intact with no signs of infection. Problem: Injury - Risk of, Physical Injury:  Goal: Will remain free from falls  Will remain free from falls       Outcome: Met This Shift  Pt free of falls at this time. Bed in lowest locked position. Bedside table and call light within reach. Bed alarm on. Avasys in place. Will continue to monitor.

## 2018-09-11 NOTE — DISCHARGE SUMMARY
currently on RA.  - Pulmonary consulted, pt is s/p bronch which showed mucous plugging. BAL grew NRF. - Pt received 5 days of empiric Zosyn and Vanc.  - SLP consulted. Pt underwent MBSS  which showed signs of aspiration. He is currently on dysphagia diet. - Continue aspiration precautions. - Encourage pulmonary toilet with IS and acapella.      Closed fracture of left femoral neck secondary to mechanical fall in the setting of late onset Alzheimer's patient with behavioral disturbances:  - Orthopedic surgery consulted - pt is s/p left hip hemiarthroplasty on 9/5/18.  - Continue PT/OT, DVT prophylaxis, bowel regimen, pain control as needed.      Hypertension, controlled:   - Continue home meds.      Acute metabolic encephalopathy (improving) in the setting of known dementia exacerbated by anesthesia and acute pain:   - Supportive management. Continue home meds. - CT head negative on admission.      Acute blood loss anemia:   - Likely due to surgical losses. H&H is stable. Physical Exam Performed:     BP (!) 172/94   Pulse 82   Temp 98 °F (36.7 °C) (Oral)   Resp 16   Ht 5' 10\" (1.778 m)   Wt 127 lb 10.3 oz (57.9 kg)   SpO2 97%   BMI 18.32 kg/m²       General appearance: Frail elderly male sitting up in chair in NAD, cooperative. HEENT:  Normal cephalic, atraumatic without obvious deformity. Pupils equal, round, and reactive to light.  Extra ocular muscles intact. Conjunctivae/corneas clear. Neck: Supple, with full range of motion. No jugular venous distention. Trachea midline. Respiratory: No accessory muscle use. Lungs sound clear, diminished bilateral bases. Cardiovascular:  Regular rate and rhythm with normal S1/S2 without murmurs, rubs or gallops. Abdomen: Soft, non-tender, non-distended with normal bowel sounds. Musculoskeletal:  No clubbing, cyanosis or edema bilaterally. Left hip dressing C/D/I. Left hip ecchymosis. Skin:  Pale, warm and dry.  No rashes or lesions.  Scattered HIP LEFT (2-3 VIEWS)   Final Result   Left femoral neck fracture is redemonstrated. No significant change in   configuration is identified on the current examination. CT CERVICAL SPINE WO CONTRAST   Final Result   No acute abnormality of the cervical spine. Moderate multilevel degenerative changes. CT HEAD WO CONTRAST   Final Result   No acute intracranial abnormality. Fluid level in the right maxillary sinus could represent sinusitis. XR HIP LEFT (2-3 VIEWS)   Final Result   1. Foreshortening of the left femoral neck is suspicious for an acute   fracture. XR CHEST PORTABLE    (Results Pending)          Consults:     IP CONSULT TO ORTHOPEDIC SURGERY  IP CONSULT TO HOSPITALIST  IP CONSULT TO PHARMACY  IP CONSULT TO PULMONOLOGY    Disposition:  [facility]     Condition at Discharge: Stable    Discharge Instructions/Follow-up:  Follow-up with PCP and Orthopedic Surgery     Code Status:  Full Code     Activity: activity as tolerated    Diet: Dysphagia II mechanically altered; Honey thick       Discharge Medications:     Current Discharge Medication List           Details   oxyCODONE (ROXICODONE) 5 MG immediate release tablet Take 1 tablet by mouth every 6 hours as needed for Pain for up to 7 days. Earnstine Gunner: 28 tablet, Refills: 0    Associated Diagnoses: Closed fracture of neck of left femur, initial encounter ([facility])      predniSONE (DELTASONE) 10 MG tablet Take 3 tablets by mouth daily for 1 day  Qty: 3 tablet, Refills: 0      enoxaparin (LOVENOX) 40 MG/0.4ML injection Inject 0.4 mLs into the skin daily for 20 days  Qty: 20 Syringe, Refills: 0              Details   donepezil (ARICEPT) 5 MG tablet Take 5 mg by mouth nightly    Associated Diagnoses: Memory loss      mirtazapine (REMERON) 15 MG tablet Take 15 mg by mouth nightly    Associated Diagnoses: Age-related physical debility      potassium chloride (KLOR-CON) 20 MEQ packet Take 20 mEq by mouth 2 times daily Associated Diagnoses: On potassium wasting diuretic therapy      amLODIPine (NORVASC) 2.5 MG tablet Take 2.5 mg by mouth daily    Associated Diagnoses: Essential hypertension      hydrochlorothiazide (HYDRODIURIL) 25 MG tablet Take 25 mg by mouth daily    Associated Diagnoses: Essential hypertension      acetaminophen (TYLENOL) 500 MG tablet Take 500 mg by mouth every 6 hours as needed for Pain    Associated Diagnoses: Age-related physical debility      Fexofenadine HCl (ALLEGRA ALLERGY PO) Take by mouth    Associated Diagnoses: Seasonal allergies      aspirin 81 MG tablet Take 81 mg by mouth daily    Associated Diagnoses: Healthcare maintenance      ketoconazole (NIZORAL) 2 % cream Apply topically daily Apply topically daily. sulfacetamide (BLEPH-10) 10 % ophthalmic ointment every 6 hours Every 6 hours. Time Spent on discharge is more than 45 minutes in the examination, evaluation, counseling and review of medications and discharge plan. Signed:    KATIE Lara - CNP   9/11/2018      Thank you Av Harley MD for the opportunity to be involved in this patient's care. If you have any questions or concerns please feel free to contact me at [phone].

## 2018-09-11 NOTE — PROGRESS NOTES
intracranial abnormality. Fluid level in the right maxillary sinus could represent sinusitis. XR HIP LEFT (2-3 VIEWS)   Final Result   1. Foreshortening of the left femoral neck is suspicious for an acute   fracture. XR CHEST PORTABLE    (Results Pending)     Xr Hip Left (1 View)    Result Date: 9/6/2018  EXAMINATION: SINGLE XRAY VIEW OF THE LEFT HIP 9/6/2018 12:30 am COMPARISON: 09/04/2018 HISTORY: ORDERING SYSTEM PROVIDED HISTORY: in PACU s/p left hipo hemiarthroplasty TECHNOLOGIST PROVIDED HISTORY: Reason for exam:->in PACU s/p left hipo hemiarthroplasty Ordering Physician Provided Reason for Exam: s/p left hip replacement FINDINGS: A left hip arthroplasty device is now present. Alignment is normal and there is no definite fracture. There is overlying soft tissue gas and skin staples. Status post left hip arthroplasty. Xr Hip Left (2-3 Views)    Result Date: 9/4/2018  EXAMINATION: 2 XRAY VIEWS OF THE LEFT HIP 9/4/2018 2:58 pm COMPARISON: None. HISTORY: ORDERING SYSTEM PROVIDED HISTORY: fracture TECHNOLOGIST PROVIDED HISTORY: In bucks traction please per Dr. Jeffry Villalobos Reason for exam:->fracture Ordering Physician Provided Reason for Exam: fracture of left hip, in bucks traction Acuity: Acute Type of Exam: Initial FINDINGS: Left femoral neck fracture is identified. No significant change in the configuration is seen status post traction placement. Femoroacetabular joint appears to be congruent. Left femoral neck fracture is redemonstrated. No significant change in configuration is identified on the current examination. Xr Hip Left (2-3 Views)    Result Date: 9/4/2018  EXAMINATION: 2 XRAY VIEWS OF THE LEFT HIP 9/4/2018 2:05 pm COMPARISON: None.  HISTORY: ORDERING SYSTEM PROVIDED HISTORY: hip pain TECHNOLOGIST PROVIDED HISTORY: Reason for exam:->hip pain Ordering Physician Provided Reason for Exam: left hip pain Acuity: Acute Type of Exam: Initial Mechanism of Injury: fall FINDINGS: There is foreshortening of the left femoral neck suspicious for an acute femoral neck fracture. There is no dislocation. The bones are demineralized. There is moderate bilateral hip osteoarthritis. Vascular calcifications are noted. 1. Foreshortening of the left femoral neck is suspicious for an acute fracture. Ct Head Wo Contrast    Result Date: 9/4/2018  EXAMINATION: CT OF THE HEAD WITHOUT CONTRAST  9/4/2018 1:56 pm TECHNIQUE: CT of the head was performed without the administration of intravenous contrast. Dose modulation, iterative reconstruction, and/or weight based adjustment of the mA/kV was utilized to reduce the radiation dose to as low as reasonably achievable. COMPARISON: None. HISTORY: ORDERING SYSTEM PROVIDED HISTORY: head trauma TECHNOLOGIST PROVIDED HISTORY: Has a \"code stroke\" or \"stroke alert\" been called? ->No Ordering Physician Provided Reason for Exam: fell in bathroom. hit back side of head, fx hip, no LOC per family Acuity: Acute Type of Exam: Initial Mechanism of Injury: dementia, FINDINGS: BRAIN/VENTRICLES: There is no acute intracranial hemorrhage, mass effect or midline shift. No abnormal extra-axial fluid collection. The gray-white differentiation is maintained without evidence of an acute infarct. There is no evidence of hydrocephalus. Moderate periventricular and subcortical white matter hypoattenuation, likely due to small vessel ischemic disease. Mild atrophic changes. ORBITS: The visualized portion of the orbits demonstrate no acute abnormality. SINUSES: Fluid level in the right maxillary sinus. SOFT TISSUES/SKULL:  No acute abnormality of the visualized skull or soft tissues. No acute intracranial abnormality. Fluid level in the right maxillary sinus could represent sinusitis.      Ct Cervical Spine Wo Contrast    Result Date: 9/4/2018  EXAMINATION: CT OF THE CERVICAL SPINE WITHOUT CONTRAST 9/4/2018 2:06 pm TECHNIQUE: CT of the cervical spine was performed without the administration of intravenous contrast. Multiplanar reformatted images are provided for review. Dose modulation, iterative reconstruction, and/or weight based adjustment of the mA/kV was utilized to reduce the radiation dose to as low as reasonably achievable. COMPARISON: None. HISTORY: ORDERING SYSTEM PROVIDED HISTORY: head trauma TECHNOLOGIST PROVIDED HISTORY: Ordering Physician Provided Reason for Exam: head trauma, fall at home, dementia Acuity: Acute Type of Exam: Initial FINDINGS: BONES/ALIGNMENT: There is no evidence of an acute cervical spine fracture. There is normal alignment of the cervical spine. DEGENERATIVE CHANGES: Osteopenia. Moderate multilevel loss of disc height. Multilevel anterior osteophyte formation and facet arthrosis. SOFT TISSUES: There is no prevertebral soft tissue swelling. No acute abnormality of the cervical spine. Moderate multilevel degenerative changes. Xr Chest Portable    Result Date: 9/7/2018  EXAMINATION: SINGLE XRAY VIEW OF THE CHEST 9/7/2018 4:49 am COMPARISON: 09/06/2018 HISTORY: ORDERING SYSTEM PROVIDED HISTORY: resp failure TECHNOLOGIST PROVIDED HISTORY: Reason for exam:->resp failure Ordering Physician Provided Reason for Exam: sob FINDINGS: Single view of the chest was obtained. Low lung volume. Patient is rotated. Cardiac leads are present. Increasing heterogeneous right basilar pulmonary opacity with decreasing definition of the right hemidiaphragm. Increasing opacity adjacent to the left heart border. No gross pneumothorax. Cardiac and mediastinal silhouettes are reflective of patient rotation. Low volume exam, with increasing bibasilar airspace disease as compared to prior. Xr Chest Portable    Result Date: 9/6/2018  EXAMINATION: SINGLE XRAY VIEW OF THE CHEST 9/6/2018 6:05 am COMPARISON: None.  HISTORY: ORDERING SYSTEM PROVIDED HISTORY: congestion TECHNOLOGIST PROVIDED HISTORY: Reason for exam:->congestion Ordering Physician Provided Reason for Exam: sob. . decrease O2 sats FINDINGS: The patient is rotated and leaning to her left. There is atelectasis or scarring in the middle lobe. There is retrocardiac left basilar airspace disease. The heart size is mildly to moderately enlarged. There is no large pleural effusion or definite evidence for pneumothorax. Left basilar atelectasis or pneumonia. Fl Modified Barium Swallow W Video    Result Date: 9/7/2018  EXAMINATION: MODIFIED BARIUM SWALLOW WAS PERFORMED IN CONJUNCTION WITH SPEECH PATHOLOGY SERVICES TECHNIQUE: Fluoroscopic evaluation of the swallowing mechanism was performed with multiple consistency of barium product. FLUOROSCOPY DOSE AND TYPE OR TIME AND EXPOSURES: 1.6 minutes fluoroscopy, 2 fluoroscopic images COMPARISON: None HISTORY: ORDERING SYSTEM PROVIDED HISTORY: aspiration TECHNOLOGIST PROVIDED HISTORY: Reason for exam:->aspiration Ordering Physician Provided Reason for Exam: 1.6 mins of fluoro FINDINGS: Aspiration was seen with thin liquid. Deep laryngeal penetration was seen with nectar consistency. Patient tolerated additional consistencies. Intermittent vallecular pooling was seen. Aspiration with thin liquid. Laryngeal penetration with nectar consistency. Please see separate speech pathology report for full discussion of findings and recommendations. Assessment and plan:  Acute hypoxemic respiratory failure ([address] Resolved. He is not on home oxygen. ?  COPD exacerbation. Discontinue steroids as per taper. Mucus plugging. Status post therapeutic aspiration 9/6, cultures negative so far. Continue aggressive pulmonary toilet. Cultures were negative, he will complete 5 days of vancomycin and Zosyn empirically. Closed fracture of neck of left femur ([address] Status post hemiarthroplasty. Anemia. Probably from hip fracture and surgery. Stable. Essential hypertension, DJD, Alzheimer's disease, hypovitaminosis D. Per IM.     Continues to improve, aggressive

## 2018-09-11 NOTE — PROGRESS NOTES
Physical Therapy  Facility/Department: [facility] C4 PCU  Daily Treatment Note  NAME: Milli Diaz  : 1924  MRN: [id number]    Date of Service: 2018    Discharge Recommendations:  Subacute/Skilled Nursing Facility   PT Equipment Recommendations  Equipment Needed: No    Patient Diagnosis(es): The primary encounter diagnosis was Closed fracture of neck of left femur, initial encounter ([facility] [address] Diagnoses of Closed head injury, initial encounter and Fall from standing, initial encounter were also pertinent to this visit. has a past medical history of Allergic rhinitis; Dementia; Hypertension; and OA (osteoarthritis). has a past surgical history that includes Appendectomy; Prostatectomy (); pr office/outpt visit,procedure only (Left, 2018); and pr [facility] incl fluor gdnce dx w/cell washg spx (N/A, 2018). Restrictions  Restrictions/Precautions  Restrictions/Precautions: Weight Bearing, Fall Risk  Lower Extremity Weight Bearing Restrictions  Left Lower Extremity Weight Bearing: Weight Bearing As Tolerated  Position Activity Restriction  Hip Precautions: Posterior hip precautions, No ADduction, No hip internal rotation, No hip flexion > 90 degrees  Other position/activity restrictions: 5LO2  Subjective   General  Chart Reviewed: Yes  Response To Previous Treatment: Patient with no complaints from previous session. Family / Caregiver Present: No  Referring Practitioner: Dedrick Carrasquillo MD  Subjective  Subjective: Pt resting in bed upon arrival, agreeable to therapy. General Comment  Comments: RN cleared pt for therapy.           Orientation  Orientation  Overall Orientation Status: Within Functional Limits  Objective   Bed mobility  Supine to Sit: Minimal assistance  Transfers  Sit to Stand: Dependent/Total (mod 2)  Stand to sit: Dependent/Total (mod 2)  Bed to Chair: Dependent/Total (sw mod 2)  Ambulation  Ambulation?: No (sw 2 steps to chair mod 2)  WB Status: WBAT LLE     Balance  Sitting - Static: Fair;+  Sitting - Dynamic: Fair  Standing - Static: Poor;+  Standing - Dynamic: Poor  Exercises  Quad Sets: 10 B  Heelslides: x 10 BLE  Gluteal Sets: 10  Hip Flexion: x10 RLE supine <90*  Knee Long Arc Quad: x10 BLE   Ankle Pumps: x10 in bed, x 10 seated       Assessment   Body structures, Functions, Activity limitations: Decreased functional mobility ; Decreased strength;Decreased cognition;Decreased endurance;Decreased balance  Treatment Diagnosis: impaired functional mobility  Prognosis: Good  Patient Education: Educated on role of PT, use of call light and safety with mobility, hip precautions  REQUIRES PT FOLLOW UP: Yes  Activity Tolerance  Activity Tolerance: Patient Tolerated treatment well;Patient limited by endurance     G-Code  AM-PAC Score             Goals  Short term goals  Time Frame for Short term goals: 5 days  Short term goal 1: Pt will perform bed mobility with mod A - PARTIALLY MET 9/10/18  Short term goal 2: Pt will perform sit-stand transfers with mod A  Short term goal 3: Pt will ambulate 13' with walker and mod A  Short term goal 4: By 9/09/18, pt will tolerate 15 reps of LE ther ex for improved strength and activity tolerance - MET 9/10/18  Patient Goals   Patient goals : pt unable to state goal    Plan    Plan  Times per week: 7x/wk  Times per day: Daily  Specific instructions for Next Treatment: progress transfers and ambulation as able. Current Treatment Recommendations: Strengthening, Balance Training, Transfer Training, Functional Mobility Training, Gait Training, Patient/Caregiver Education & Training, Home Exercise Program  Safety Devices  Type of devices:  All fall risk precautions in place, Gait belt, Patient at risk for falls, Nurse notified, Chair alarm in place, Left in chair, Call light within reach     Therapy Time   Individual Concurrent Group Co-treatment   Time In       [address]   Time Out       0935   Minutes       [address] [facility]

## 2018-09-11 NOTE — PROGRESS NOTES
Speech Language Pathology  Facility/Department: [facility]  Dysphagia Daily Treatment Note    NAME: Evelyn Jones  : 1924  MRN: [id number]    Patient Diagnosis(es):   Patient Active Problem List    Diagnosis Date Noted    Atelectasis of both lungs     Mucus plugging of bronchi     Acute hypoxemic respiratory failure ([facility] [address] 2018    Hypovitaminosis D 2018    Closed fracture of neck of left femur ([facility] [address] 2018    OA (osteoarthritis) 2018    Late onset Alzheimer's disease with behavioral disturbance 2018    Essential hypertension 2018    History of recent fall 2018    Age-related physical debility 2018     Allergies: Allergies   Allergen Reactions    Claritin [Loratadine]      Subjective: Pt alert, agreeable to PO trials. Per RN, pt no longer requiring O2 via nasal canula; pt did not appear SOB this date. Pain: No report of pain    Current Diet: Dental Soft / HTL    Diet Tolerance:  Patient tolerating current diet level without signs/symptoms of penetration / aspiration. P.O. Trials: Thin    n/a   Nectar    n/a   Honey   x Sips OJ, coffee, milk   Puree    n/a   Solid   x Eggs; ground sausage     Dysphagia Therapy:  Pt calmer this date; O2 sats reading 92-93% throughout session. Pt seated upright in bed with breakfast tray in front of him. Ground sausage, scrambled eggs were tolerated well, as was oatmeal, with pt more easily feeding himself this date 2/2 chopped foods. Reduced frequency of coughing this date, cough x5 over entire session, which did not appear related to PO intake. No throat clear, no change in O2 or RR with PO intake, no change in vocal quality. Continue Dysphagia 2, Honey thick liquids as pt appears to be tolerating well. CXR dated 2018:   Impression   Increased hazy opacity at the lung bases, favored to represent atelectasis   rather than pneumonia       Goals:    Long Term:   Timeframe for Long-term Goals: 1 week  Goal 1: Pt will tolerate least restrictive and safest PO diet without clinical signs of aspiration. 09/11 ongoing; pt with intermittent delayed, wet cough; CXR indicating \"increased hazy opacity at the lung bases, favored to represent atelectasis rather than pneumonia\"     Short Term:  Goal 1: Pt will tolerate instrumental swallowing procedure to determine least restrictive and safest PO diet. 09/07 GOAL MET  Goal 2: Pt will tolerate HTL in 10/10 trials with no clinical signs of aspiration. 09/11 ongoing; occasional wet cough x5, appeared unrelated to HTL intake  Goal 3: Pt will tolerate dysphagia 2 foods in 10/10 trials with no clinical signs of aspiration. 09/11 pt appeared to tolerate downgrade to dysphagia 2 from yesterday. Pt able to feed himself safely this date, with ground foods. Due to ground foods, pt's bite size was smaller than what pt was attempting yesterday at the bedside. Goal 4: Pt will complete pharyngeal strengthening exercises in 10/10 with mod cues. (attempt, if pt is able to follow directions) 09/11 did not attempt this date 2/2 confusion    Recommendations:  Dysphagia 2  Honey Thick liquids    Patient/Family/Caregiver Education:  Reviewed recommendations, no evidence of learning    Compensatory Strategies:  · Upright for all oral intake  · Small bites/sips  · Eat slowly  · Alternate solids/liquids  · Oral care     Plan:    Continued Dysphagia treatment with goals per plan of care. Discharge Recommendations:  LTC or 24-hr supervision    If pt discharges from hospital prior to Speech/Swallowing discharge, this note serves as tx and discharge summary.      Total Treatment Time:  9502-5405, 15 min, Dysphagia Tx    Dominga Barber M.A., [address]  Speech Language Pathologist

## 2018-09-12 ENCOUNTER — TELEPHONE (OUTPATIENT)
Dept: FAMILY MEDICINE CLINIC | Age: 83
End: 2018-09-12

## 2018-09-12 LAB
EKG ATRIAL RATE: 98 BPM
EKG DIAGNOSIS: NORMAL
EKG P AXIS: 113 DEGREES
EKG P-R INTERVAL: 242 MS
EKG Q-T INTERVAL: 346 MS
EKG QRS DURATION: 80 MS
EKG QTC CALCULATION (BAZETT): 441 MS
EKG R AXIS: 6 DEGREES
EKG T AXIS: 36 DEGREES
EKG VENTRICULAR RATE: 98 BPM

## 2018-09-12 NOTE — TELEPHONE ENCOUNTER
Shanda Don left vm at 8:14 pm on 9/11 stating he was in an extended care facility and would reschedule when discharged.  [phone]

## 2018-09-19 ENCOUNTER — OFFICE VISIT (OUTPATIENT)
Dept: ORTHOPEDIC SURGERY | Age: 83
End: 2018-09-19

## 2018-09-19 DIAGNOSIS — M25.552 HIP PAIN, LEFT: Primary | ICD-10-CM

## 2018-09-19 DIAGNOSIS — S72.002D CLOSED FRACTURE OF NECK OF LEFT FEMUR WITH ROUTINE HEALING, SUBSEQUENT ENCOUNTER: ICD-10-CM

## 2018-09-19 PROCEDURE — 99024 POSTOP FOLLOW-UP VISIT: CPT | Performed by: ORTHOPAEDIC SURGERY

## 2018-09-19 NOTE — PROGRESS NOTES
Subjective: Patient states that he is here for follow-up of his left hip femoral neck fracture status post bipolar press-fit hemiarthroplasty 18. He states he has very little pain is been walking with a walker. He is here with a family member  Objective: Physical exam shows systems healing nicely no evidence of infection sensations intact he has 3+ over 5 hip flexion strength 4/5 knee extension negative log roll minimal swelling no evidence of DVT  Imagin views of the left hip. Shows his prosthesis in good position no changes. Assessment and plan: Patient's doing well.   A can take his staples out at nursing home and he'll follow-up with me in 6 weeks repeat x-rays and it looks good on discharge and

## 2018-10-03 ENCOUNTER — HOSPITAL ENCOUNTER (OUTPATIENT)
Dept: GENERAL RADIOLOGY | Age: 83
Discharge: HOME | End: 2018-10-03
Payer: MEDICARE

## 2018-10-03 DIAGNOSIS — R13.10 DYSPHAGIA, UNSPECIFIED TYPE: ICD-10-CM

## 2018-10-03 PROCEDURE — 74230 X-RAY XM SWLNG FUNCJ C+: CPT

## 2018-10-08 LAB
FUNGUS (MYCOLOGY) CULTURE: ABNORMAL
FUNGUS (MYCOLOGY) CULTURE: ABNORMAL
FUNGUS STAIN: ABNORMAL
ORGANISM: ABNORMAL

## 2018-10-23 LAB
AFB CULTURE (MYCOBACTERIA): NORMAL
AFB SMEAR: NORMAL

## 2018-10-31 ENCOUNTER — OFFICE VISIT (OUTPATIENT)
Dept: ORTHOPEDIC SURGERY | Age: 83
End: 2018-10-31

## 2018-10-31 VITALS — WEIGHT: 127.65 LBS | BODY MASS INDEX: 18.27 KG/M2 | HEIGHT: 70 IN

## 2018-10-31 DIAGNOSIS — M25.552 HIP PAIN, LEFT: Primary | ICD-10-CM

## 2018-10-31 DIAGNOSIS — S72.002D CLOSED FRACTURE OF NECK OF LEFT FEMUR WITH ROUTINE HEALING, SUBSEQUENT ENCOUNTER: ICD-10-CM

## 2018-10-31 PROCEDURE — 99024 POSTOP FOLLOW-UP VISIT: CPT | Performed by: ORTHOPAEDIC SURGERY

## 2018-10-31 NOTE — PROGRESS NOTES
Subjective: Patient states that she is here for follow-up of her 18 left hip bipolar press fit hemiarthroplasty. He is here with a family member and is staying at the [facility]. Denies pain. Objective: Physical exam shows incisions well-healed no evidence of infection is negative logroll strength is 4/5 in hip flexion and knee extension. Imagin views of the left hip shows his prosthesis in good position no evidence of loosening  Assessment and plan: Patient is doing well.   He'll follow-up with me as needed only

## 2018-11-06 ENCOUNTER — APPOINTMENT (OUTPATIENT)
Dept: GENERAL RADIOLOGY | Age: 83
End: 2018-11-06
Payer: MEDICARE

## 2018-11-06 ENCOUNTER — HOSPITAL ENCOUNTER (EMERGENCY)
Age: 83
Discharge: HOME | End: 2018-11-06
Payer: MEDICARE

## 2018-11-06 VITALS
OXYGEN SATURATION: 96 % | HEART RATE: 83 BPM | DIASTOLIC BLOOD PRESSURE: 67 MMHG | SYSTOLIC BLOOD PRESSURE: 125 MMHG | WEIGHT: 127 LBS | HEIGHT: 66 IN | BODY MASS INDEX: 20.41 KG/M2 | TEMPERATURE: 98 F | RESPIRATION RATE: 16 BRPM

## 2018-11-06 DIAGNOSIS — R06.2 WHEEZING: ICD-10-CM

## 2018-11-06 DIAGNOSIS — R05.9 COUGH: Primary | ICD-10-CM

## 2018-11-06 LAB
A/G RATIO: 1.1 (ref 1.1–2.2)
ALBUMIN SERPL-MCNC: 3.5 G/DL (ref 3.4–5)
ALP BLD-CCNC: 102 U/L (ref 40–129)
ALT SERPL-CCNC: 20 U/L (ref 10–40)
ANION GAP SERPL CALCULATED.3IONS-SCNC: 8 MMOL/L (ref 3–16)
AST SERPL-CCNC: 16 U/L (ref 15–37)
BASOPHILS ABSOLUTE: 0.1 K/UL (ref 0–0.2)
BASOPHILS RELATIVE PERCENT: 0.6 %
BILIRUB SERPL-MCNC: 0.3 MG/DL (ref 0–1)
BUN BLDV-MCNC: 26 MG/DL (ref 7–20)
CALCIUM SERPL-MCNC: 9.1 MG/DL (ref 8.3–10.6)
CHLORIDE BLD-SCNC: 100 MMOL/L (ref 99–110)
CO2: 32 MMOL/L (ref 21–32)
CREAT SERPL-MCNC: 0.6 MG/DL (ref 0.8–1.3)
EKG ATRIAL RATE: 75 BPM
EKG DIAGNOSIS: NORMAL
EKG P AXIS: 115 DEGREES
EKG P-R INTERVAL: 244 MS
EKG Q-T INTERVAL: 374 MS
EKG QRS DURATION: 88 MS
EKG QTC CALCULATION (BAZETT): 417 MS
EKG R AXIS: 2 DEGREES
EKG T AXIS: 34 DEGREES
EKG VENTRICULAR RATE: 75 BPM
EOSINOPHILS ABSOLUTE: 0.2 K/UL (ref 0–0.6)
EOSINOPHILS RELATIVE PERCENT: 2.1 %
GFR AFRICAN AMERICAN: >60
GFR NON-AFRICAN AMERICAN: >60
GLOBULIN: 3.2 G/DL
GLUCOSE BLD-MCNC: 98 MG/DL (ref 70–99)
HCT VFR BLD CALC: 35.9 % (ref 40.5–52.5)
HEMOGLOBIN: 12.1 G/DL (ref 13.5–17.5)
LYMPHOCYTES ABSOLUTE: 1.4 K/UL (ref 1–5.1)
LYMPHOCYTES RELATIVE PERCENT: 15.1 %
MCH RBC QN AUTO: 30.3 PG (ref 26–34)
MCHC RBC AUTO-ENTMCNC: 33.6 G/DL (ref 31–36)
MCV RBC AUTO: 90.1 FL (ref 80–100)
MONOCYTES ABSOLUTE: 0.6 K/UL (ref 0–1.3)
MONOCYTES RELATIVE PERCENT: 6.2 %
NEUTROPHILS ABSOLUTE: 7 K/UL (ref 1.7–7.7)
NEUTROPHILS RELATIVE PERCENT: 76 %
PDW BLD-RTO: 15.2 % (ref 12.4–15.4)
PLATELET # BLD: 301 K/UL (ref 135–450)
PMV BLD AUTO: 7.5 FL (ref 5–10.5)
POTASSIUM SERPL-SCNC: 4.8 MMOL/L (ref 3.5–5.1)
RBC # BLD: 3.99 M/UL (ref 4.2–5.9)
SODIUM BLD-SCNC: 140 MMOL/L (ref 136–145)
TOTAL PROTEIN: 6.7 G/DL (ref 6.4–8.2)
TROPONIN: <0.01 NG/ML
WBC # BLD: 9 K/UL (ref 4–11)

## 2018-11-06 PROCEDURE — 93010 ELECTROCARDIOGRAM REPORT: CPT | Performed by: INTERNAL MEDICINE

## 2018-11-06 PROCEDURE — 99284 EMERGENCY DEPT VISIT MOD MDM: CPT

## 2018-11-06 PROCEDURE — 71046 X-RAY EXAM CHEST 2 VIEWS: CPT

## 2018-11-06 PROCEDURE — 6370000000 HC RX 637 (ALT 250 FOR IP): Performed by: NURSE PRACTITIONER

## 2018-11-06 PROCEDURE — 93005 ELECTROCARDIOGRAM TRACING: CPT | Performed by: EMERGENCY MEDICINE

## 2018-11-06 PROCEDURE — 84484 ASSAY OF TROPONIN QUANT: CPT

## 2018-11-06 PROCEDURE — 94640 AIRWAY INHALATION TREATMENT: CPT

## 2018-11-06 PROCEDURE — 85025 COMPLETE CBC W/AUTO DIFF WBC: CPT

## 2018-11-06 PROCEDURE — 94664 DEMO&/EVAL PT USE INHALER: CPT

## 2018-11-06 PROCEDURE — 80053 COMPREHEN METABOLIC PANEL: CPT

## 2018-11-06 RX ORDER — MIRTAZAPINE 15 MG/1
15 TABLET, FILM COATED ORAL NIGHTLY
COMMUNITY
End: 2020-01-01

## 2018-11-06 RX ORDER — GUAIFENESIN 100 MG/5ML
200 SOLUTION ORAL ONCE
Status: COMPLETED | OUTPATIENT
Start: 2018-11-06 | End: 2018-11-06

## 2018-11-06 RX ORDER — OMEPRAZOLE 20 MG/1
20 CAPSULE, DELAYED RELEASE ORAL NIGHTLY
COMMUNITY
End: 2020-01-01

## 2018-11-06 RX ORDER — GUAIFENESIN 100 MG/5ML
100 SYRUP ORAL 4 TIMES DAILY PRN
COMMUNITY
End: 2020-01-01

## 2018-11-06 RX ORDER — IPRATROPIUM BROMIDE AND ALBUTEROL SULFATE 2.5; .5 MG/3ML; MG/3ML
1 SOLUTION RESPIRATORY (INHALATION) ONCE
Status: COMPLETED | OUTPATIENT
Start: 2018-11-06 | End: 2018-11-06

## 2018-11-06 RX ORDER — IPRATROPIUM BROMIDE AND ALBUTEROL SULFATE 2.5; .5 MG/3ML; MG/3ML
1 SOLUTION RESPIRATORY (INHALATION) 2 TIMES DAILY
COMMUNITY

## 2018-11-06 RX ORDER — ALBUTEROL SULFATE 90 UG/1
AEROSOL, METERED RESPIRATORY (INHALATION)
Qty: 1 INHALER | Refills: 0 | Status: SHIPPED | OUTPATIENT
Start: 2018-11-06 | End: 2020-01-01

## 2018-11-06 RX ORDER — MONTELUKAST SODIUM 10 MG/1
10 TABLET ORAL NIGHTLY
COMMUNITY
End: 2020-01-01

## 2018-11-06 RX ORDER — OXYCODONE HYDROCHLORIDE 5 MG/1
5 TABLET ORAL EVERY 6 HOURS PRN
COMMUNITY
End: 2018-11-07

## 2018-11-06 RX ADMIN — IPRATROPIUM BROMIDE AND ALBUTEROL SULFATE 1 AMPULE: .5; 3 SOLUTION RESPIRATORY (INHALATION) at 13:31

## 2018-11-06 RX ADMIN — GUAIFENESIN 200 MG: 200 SOLUTION ORAL at 13:52

## 2018-11-06 NOTE — ED PROVIDER NOTES
[facility]  ED   Evaluated by Advanced Practice Provider    CHIEF COMPLAINT  Cough (pt son states patinet has been cough and wheezing. Pt discharged yesterday from [facility] rehab and son states he has been SOB, coughing and wheezing since he returned home.)    TALAT Augustine is a 80 y.o. male who presents to the emergency department with complaints of cough. Had PNA a couple weeks ago. Was in rehab at [facility]. Released yesterday. Coughing and wheezing for the past few days. While at [employer] with PNA, completed antibiotics. Symptoms did improve. Minimally productive cough. Denies chills, diaphoresis, fevers. Denies CP, SOB. Cough and wheezing worse with walking and laying flat. Patient denies any chest pain or shortness of breath, patient denies abdominal pain, nausea, vomiting, diarrhea. Patient denies any fever or chills. The patient is currently rating their pain as 0/10. Treatments tried prior to arrival in the ED include: cough syrup did help last night. The patient denies other complaints, modifying factors or associated symptoms. The patient arrived to the ED via private car. Patient is accompanied by son who is/are bedside for the visit.     PAST MEDICAL HISTORY    Past Medical History:   Diagnosis Date    Allergic rhinitis     Dementia     Hypertension     OA (osteoarthritis) 8/13/2018       SURGICAL HISTORY    Past Surgical History:   Procedure Laterality Date    APPENDECTOMY      [place] [address] [facility] INCL FLUOR GDNCE DX W/CELL WASHG SPX N/A 9/6/2018    BRONCHOSCOPY performed by Harper Diane MD at [facility] OFFICE/OUTPT [facility] Left 9/5/2018    LEFT HIP HEMIARTHROPLASTY performed by Lashay Candelaria MD at [facility]    Current Outpatient Rx   Medication Sig Dispense Refill    omeprazole (PRILOSEC) 20 MG delayed release capsule Take 20 mg by mouth nightly      Protein 6.7 6.4 - 8.2 g/dL    Alb 3.5 3.4 - 5.0 g/dL    Albumin/Globulin Ratio 1.1 1.1 - 2.2    Total Bilirubin 0.3 0.0 - 1.0 mg/dL    Alkaline Phosphatase 102 40 - 129 U/L    ALT 20 10 - 40 U/L    AST 16 15 - 37 U/L    Globulin 3.2 g/dL   Troponin   Result Value Ref Range    Troponin <0.01 <0.01 ng/mL   CBC Auto Differential   Result Value Ref Range    WBC 9.0 4.0 - 11.0 K/uL    RBC 3.99 (L) 4.20 - 5.90 M/uL    Hemoglobin 12.1 (L) 13.5 - 17.5 g/dL    Hematocrit 35.9 (L) 40.5 - 52.5 %    MCV 90.1 80.0 - 100.0 fL    MCH 30.3 26.0 - 34.0 pg    MCHC 33.6 31.0 - 36.0 g/dL    RDW 15.2 12.4 - 15.4 %    Platelets 427 839 - 528 K/uL    MPV 7.5 5.0 - 10.5 fL    Neutrophils % 76.0 %    Lymphocytes % 15.1 %    Monocytes % 6.2 %    Eosinophils % 2.1 %    Basophils % 0.6 %    Neutrophils # 7.0 1.7 - 7.7 K/uL    Lymphocytes # 1.4 1.0 - 5.1 K/uL    Monocytes # 0.6 0.0 - 1.3 K/uL    Eosinophils # 0.2 0.0 - 0.6 K/uL    Basophils # 0.1 0.0 - 0.2 K/uL   EKG 12 Lead   Result Value Ref Range    Ventricular Rate 75 BPM    Atrial Rate 75 BPM    P-R Interval 244 ms    QRS Duration 88 ms    Q-T Interval 374 ms    QTc Calculation (Bazett) 417 ms    P Axis 115 degrees    R Axis 2 degrees    T Axis 34 degrees    Diagnosis       Sinus rhythm with sinus arrhythmia with 1st degree A-V blockOtherwise normal ECGWhen compared with ECG of 04-SEP-2018 13:42,Aberrant conduction is no longer Present       RADIOLOGY    Xr Chest Standard (2 Vw)    Result Date: 11/6/2018  EXAMINATION: TWO VIEWS OF THE CHEST 11/6/2018 11:26 am COMPARISON: 09/11/2018 HISTORY: ORDERING SYSTEM PROVIDED HISTORY: cough TECHNOLOGIST PROVIDED HISTORY: Reason for exam:->cough Ordering Physician Provided Reason for Exam: sob, cough, wheezing Acuity: Acute Type of Exam: Initial FINDINGS: The cardiomediastinal silhouette is stable. Hiatal hernia. Linear atelectatic changes in the midlung zones bilaterally. Bibasilar hypoaeration. No pulmonary vascular congestion or edema.      1.

## 2018-11-07 ENCOUNTER — OFFICE VISIT (OUTPATIENT)
Dept: FAMILY MEDICINE CLINIC | Age: 83
End: 2018-11-07
Payer: MEDICARE

## 2018-11-07 VITALS
WEIGHT: 121.8 LBS | OXYGEN SATURATION: 99 % | HEIGHT: 66 IN | DIASTOLIC BLOOD PRESSURE: 80 MMHG | BODY MASS INDEX: 19.58 KG/M2 | HEART RATE: 67 BPM | SYSTOLIC BLOOD PRESSURE: 122 MMHG

## 2018-11-07 DIAGNOSIS — G30.1 LATE ONSET ALZHEIMER'S DISEASE WITH BEHAVIORAL DISTURBANCE (HCC): Chronic | ICD-10-CM

## 2018-11-07 DIAGNOSIS — R53.81 PHYSICAL DEBILITY: Chronic | ICD-10-CM

## 2018-11-07 DIAGNOSIS — T17.500A MUCUS PLUGGING OF BRONCHI: ICD-10-CM

## 2018-11-07 DIAGNOSIS — R05.3 CHRONIC COUGH: Primary | ICD-10-CM

## 2018-11-07 DIAGNOSIS — Z87.81 S/P LEFT HIP FRACTURE: ICD-10-CM

## 2018-11-07 DIAGNOSIS — K59.01 SLOW TRANSIT CONSTIPATION: ICD-10-CM

## 2018-11-07 DIAGNOSIS — F02.818 LATE ONSET ALZHEIMER'S DISEASE WITH BEHAVIORAL DISTURBANCE (HCC): Chronic | ICD-10-CM

## 2018-11-07 PROBLEM — M19.90 ARTHRITIS: Status: ACTIVE | Noted: 2018-07-26

## 2018-11-07 PROBLEM — N30.00 ACUTE CYSTITIS WITHOUT HEMATURIA: Status: ACTIVE | Noted: 2018-07-26

## 2018-11-07 PROBLEM — R26.81 GAIT INSTABILITY: Status: ACTIVE | Noted: 2018-04-15

## 2018-11-07 PROBLEM — E86.0 DEHYDRATION: Status: ACTIVE | Noted: 2018-07-04

## 2018-11-07 PROBLEM — C44.212 BASAL CELL CARCINOMA OF RIGHT EAR: Status: ACTIVE | Noted: 2017-06-17

## 2018-11-07 PROBLEM — R29.898 WEAKNESS OF BOTH LEGS: Status: ACTIVE | Noted: 2018-07-04

## 2018-11-07 PROCEDURE — 99214 OFFICE O/P EST MOD 30 MIN: CPT | Performed by: FAMILY MEDICINE

## 2018-11-07 PROCEDURE — 1111F DSCHRG MED/CURRENT MED MERGE: CPT | Performed by: FAMILY MEDICINE

## 2018-11-07 RX ORDER — KETOCONAZOLE 20 MG/G
CREAM TOPICAL
COMMUNITY
Start: 2017-08-03 | End: 2020-01-01

## 2018-11-07 RX ORDER — FEXOFENADINE HCL 180 MG/1
TABLET ORAL
COMMUNITY
End: 2020-01-01

## 2018-11-07 RX ORDER — POTASSIUM CHLORIDE 20 MEQ/1
40 TABLET, EXTENDED RELEASE ORAL
COMMUNITY
Start: 2018-08-02 | End: 2020-01-01

## 2018-11-07 RX ORDER — BENZONATATE 100 MG/1
100 CAPSULE ORAL 3 TIMES DAILY PRN
Qty: 21 CAPSULE | Refills: 0 | Status: SHIPPED | OUTPATIENT
Start: 2018-11-07 | End: 2018-11-14

## 2018-11-07 ASSESSMENT — ENCOUNTER SYMPTOMS
TROUBLE SWALLOWING: 0
RHINORRHEA: 0
WHEEZING: 0
DIARRHEA: 0
CONSTIPATION: 0
ABDOMINAL PAIN: 0
NAUSEA: 0
COUGH: 1
CHEST TIGHTNESS: 0
VOMITING: 0
COLOR CHANGE: 0
SHORTNESS OF BREATH: 0
SORE THROAT: 0
BLOOD IN STOOL: 0

## 2018-11-07 NOTE — PROGRESS NOTES
constipation     Review of Systems   Constitutional: Positive for activity change. Negative for appetite change, chills, diaphoresis, fatigue and fever. HENT: Positive for postnasal drip. Negative for congestion, ear pain, hearing loss, rhinorrhea, sore throat and trouble swallowing. Eyes: Negative for visual disturbance. Respiratory: Positive for cough. Negative for chest tightness, shortness of breath and wheezing. Cardiovascular: Negative for chest pain, palpitations and leg swelling. Gastrointestinal: Negative for abdominal pain, blood in stool, constipation, diarrhea, nausea and vomiting. Genitourinary: Negative for decreased urine volume, difficulty urinating, dysuria and urgency. Musculoskeletal: Positive for arthralgias. Skin: Negative for color change and rash. Neurological: Positive for dizziness (early AM, everyday, last for few mins when sitting up). Negative for weakness, light-headedness, numbness and headaches. Psychiatric/Behavioral: Negative for sleep disturbance. Vitals:    11/07/18 1306   BP: 122/80   Site: Right Upper Arm   Position: Sitting   Cuff Size: Medium Adult   Pulse: 67   SpO2: 99%   Weight: 121 lb 12.8 oz (55.2 kg)   Height: 5' 6\" (1.676 m)     Body mass index is 19.66 kg/m². Wt Readings from Last 3 Encounters:   11/07/18 121 lb 12.8 oz (55.2 kg)   11/06/18 127 lb (57.6 kg)   10/31/18 127 lb 10.3 oz (57.9 kg)     BP Readings from Last 3 Encounters:   11/07/18 122/80   11/06/18 125/67   09/19/18 125/75       Physical Exam   Constitutional: He appears well-developed and well-nourished. No distress. Thin, in wheel chair   HENT:   Head: Normocephalic and atraumatic. Mouth/Throat: Oropharynx is clear and moist.   Eyes: Pupils are equal, round, and reactive to light. EOM are normal. Right eye exhibits no discharge. Left eye exhibits no discharge. Neck: Normal range of motion. Neck supple.    Cardiovascular: Normal rate, regular rhythm and intact distal

## 2018-11-08 ENCOUNTER — CARE COORDINATION (OUTPATIENT)
Dept: CARE COORDINATION | Age: 83
End: 2018-11-08

## 2018-11-09 ENCOUNTER — TELEPHONE (OUTPATIENT)
Dept: FAMILY MEDICINE CLINIC | Age: 83
End: 2018-11-09

## 2018-11-09 DIAGNOSIS — G30.1 LATE ONSET ALZHEIMER'S DISEASE WITH BEHAVIORAL DISTURBANCE (HCC): Chronic | ICD-10-CM

## 2018-11-09 DIAGNOSIS — F02.818 LATE ONSET ALZHEIMER'S DISEASE WITH BEHAVIORAL DISTURBANCE (HCC): Chronic | ICD-10-CM

## 2018-11-09 DIAGNOSIS — R26.81 GAIT INSTABILITY: Primary | ICD-10-CM

## 2018-11-09 DIAGNOSIS — I10 ESSENTIAL HYPERTENSION: Chronic | ICD-10-CM

## 2018-11-09 DIAGNOSIS — R29.898 WEAKNESS OF BOTH LEGS: ICD-10-CM

## 2018-11-09 DIAGNOSIS — R53.81 PHYSICAL DEBILITY: Chronic | ICD-10-CM

## 2018-11-10 ENCOUNTER — HOSPITAL ENCOUNTER (EMERGENCY)
Age: 83
Discharge: HOME | End: 2018-11-10
Attending: EMERGENCY MEDICINE
Payer: MEDICARE

## 2018-11-10 ENCOUNTER — APPOINTMENT (OUTPATIENT)
Dept: GENERAL RADIOLOGY | Age: 83
End: 2018-11-10
Payer: MEDICARE

## 2018-11-10 VITALS
HEIGHT: 66 IN | BODY MASS INDEX: 20.09 KG/M2 | OXYGEN SATURATION: 94 % | RESPIRATION RATE: 18 BRPM | TEMPERATURE: 97.7 F | HEART RATE: 99 BPM | DIASTOLIC BLOOD PRESSURE: 79 MMHG | SYSTOLIC BLOOD PRESSURE: 143 MMHG | WEIGHT: 125 LBS

## 2018-11-10 DIAGNOSIS — R53.1 GENERALIZED WEAKNESS: Primary | ICD-10-CM

## 2018-11-10 LAB
A/G RATIO: 1 (ref 1.1–2.2)
ALBUMIN SERPL-MCNC: 3.2 G/DL (ref 3.4–5)
ALP BLD-CCNC: 93 U/L (ref 40–129)
ALT SERPL-CCNC: 10 U/L (ref 10–40)
ANION GAP SERPL CALCULATED.3IONS-SCNC: 8 MMOL/L (ref 3–16)
AST SERPL-CCNC: 12 U/L (ref 15–37)
BILIRUB SERPL-MCNC: <0.2 MG/DL (ref 0–1)
BILIRUBIN URINE: NEGATIVE
BLOOD, URINE: NEGATIVE
BUN BLDV-MCNC: 25 MG/DL (ref 7–20)
CALCIUM SERPL-MCNC: 9 MG/DL (ref 8.3–10.6)
CHLORIDE BLD-SCNC: 100 MMOL/L (ref 99–110)
CLARITY: CLEAR
CO2: 30 MMOL/L (ref 21–32)
COLOR: YELLOW
CREAT SERPL-MCNC: 0.7 MG/DL (ref 0.8–1.3)
GFR AFRICAN AMERICAN: >60
GFR NON-AFRICAN AMERICAN: >60
GLOBULIN: 3.2 G/DL
GLUCOSE BLD-MCNC: 154 MG/DL (ref 70–99)
GLUCOSE URINE: NEGATIVE MG/DL
HCT VFR BLD CALC: 33.4 % (ref 40.5–52.5)
HEMOGLOBIN: 11.1 G/DL (ref 13.5–17.5)
KETONES, URINE: NEGATIVE MG/DL
LEUKOCYTE ESTERASE, URINE: NEGATIVE
MCH RBC QN AUTO: 29.7 PG (ref 26–34)
MCHC RBC AUTO-ENTMCNC: 33.2 G/DL (ref 31–36)
MCV RBC AUTO: 89.4 FL (ref 80–100)
MICROSCOPIC EXAMINATION: NORMAL
NITRITE, URINE: NEGATIVE
PDW BLD-RTO: 14.8 % (ref 12.4–15.4)
PH UA: 5.5
PLATELET # BLD: 248 K/UL (ref 135–450)
PMV BLD AUTO: 7.9 FL (ref 5–10.5)
POTASSIUM SERPL-SCNC: 4 MMOL/L (ref 3.5–5.1)
PROTEIN UA: NEGATIVE MG/DL
RBC # BLD: 3.74 M/UL (ref 4.2–5.9)
SODIUM BLD-SCNC: 138 MMOL/L (ref 136–145)
SPECIFIC GRAVITY UA: >=1.03
TOTAL PROTEIN: 6.4 G/DL (ref 6.4–8.2)
URINE TYPE: NORMAL
UROBILINOGEN, URINE: 0.2 E.U./DL
WBC # BLD: 6.7 K/UL (ref 4–11)

## 2018-11-10 PROCEDURE — 94664 DEMO&/EVAL PT USE INHALER: CPT

## 2018-11-10 PROCEDURE — 85027 COMPLETE CBC AUTOMATED: CPT

## 2018-11-10 PROCEDURE — 99285 EMERGENCY DEPT VISIT HI MDM: CPT

## 2018-11-10 PROCEDURE — 97162 PT EVAL MOD COMPLEX 30 MIN: CPT

## 2018-11-10 PROCEDURE — 96361 HYDRATE IV INFUSION ADD-ON: CPT

## 2018-11-10 PROCEDURE — 6370000000 HC RX 637 (ALT 250 FOR IP): Performed by: NURSE PRACTITIONER

## 2018-11-10 PROCEDURE — 97167 OT EVAL HIGH COMPLEX 60 MIN: CPT

## 2018-11-10 PROCEDURE — 97530 THERAPEUTIC ACTIVITIES: CPT

## 2018-11-10 PROCEDURE — 94640 AIRWAY INHALATION TREATMENT: CPT

## 2018-11-10 PROCEDURE — 80053 COMPREHEN METABOLIC PANEL: CPT

## 2018-11-10 PROCEDURE — 93005 ELECTROCARDIOGRAM TRACING: CPT | Performed by: NURSE PRACTITIONER

## 2018-11-10 PROCEDURE — 71046 X-RAY EXAM CHEST 2 VIEWS: CPT

## 2018-11-10 PROCEDURE — 96360 HYDRATION IV INFUSION INIT: CPT

## 2018-11-10 PROCEDURE — G8978 MOBILITY CURRENT STATUS: HCPCS

## 2018-11-10 PROCEDURE — 81003 URINALYSIS AUTO W/O SCOPE: CPT

## 2018-11-10 PROCEDURE — G8988 SELF CARE GOAL STATUS: HCPCS

## 2018-11-10 PROCEDURE — G8987 SELF CARE CURRENT STATUS: HCPCS

## 2018-11-10 PROCEDURE — 2580000003 HC RX 258: Performed by: NURSE PRACTITIONER

## 2018-11-10 PROCEDURE — G8979 MOBILITY GOAL STATUS: HCPCS

## 2018-11-10 RX ORDER — IPRATROPIUM BROMIDE AND ALBUTEROL SULFATE 2.5; .5 MG/3ML; MG/3ML
1 SOLUTION RESPIRATORY (INHALATION)
Status: DISCONTINUED | OUTPATIENT
Start: 2018-11-10 | End: 2018-11-10

## 2018-11-10 RX ORDER — 0.9 % SODIUM CHLORIDE 0.9 %
500 INTRAVENOUS SOLUTION INTRAVENOUS ONCE
Status: COMPLETED | OUTPATIENT
Start: 2018-11-10 | End: 2018-11-10

## 2018-11-10 RX ORDER — IPRATROPIUM BROMIDE AND ALBUTEROL SULFATE 2.5; .5 MG/3ML; MG/3ML
1 SOLUTION RESPIRATORY (INHALATION) ONCE
Status: COMPLETED | OUTPATIENT
Start: 2018-11-10 | End: 2018-11-10

## 2018-11-10 RX ADMIN — IPRATROPIUM BROMIDE AND ALBUTEROL SULFATE 1 AMPULE: .5; 3 SOLUTION RESPIRATORY (INHALATION) at 14:42

## 2018-11-10 RX ADMIN — SODIUM CHLORIDE 500 ML: 9 INJECTION, SOLUTION INTRAVENOUS at 15:56

## 2018-11-10 NOTE — PROGRESS NOTES
Physical Therapy    Facility/Department: [facility]  ED  Initial Assessment and treatment     NAME: Brayan Bonner  : 1924  MRN: [id number]    Date of Service: 11/10/2018    Discharge Recommendations:  Subacute/Skilled Nursing Facility   PT Equipment Recommendations  Equipment Needed: No    Patient Diagnosis(es): There were no encounter diagnoses. has a past medical history of Allergic rhinitis; Basal cell carcinoma of right ear; Bilateral low back pain without sciatica; Dementia; Hypertension; and OA (osteoarthritis). has a past surgical history that includes Appendectomy; Prostatectomy (); pr office/outpt visit,procedure only (Left, 2018); and pr [facility] incl fluor gdnce dx w/cell washg spx (N/A, 2018). Restrictions  Restrictions/Precautions  Restrictions/Precautions: Weight Bearing, Fall Risk  Lower Extremity Weight Bearing Restrictions  Left Lower Extremity Weight Bearing: Weight Bearing As Tolerated  Position Activity Restriction  Other position/activity restrictions: per family report: pt has been cleared from posterior hip precautions. Vision/Hearing  Vision: Impaired  Vision Exceptions: Wears glasses at all times  Hearing: Exceptions to [facility]  Hearing Exceptions: Hard of hearing/hearing concerns     Subjective  General  Chart Reviewed: Yes  Patient assessed for rehabilitation services?: Yes  Additional Pertinent Hx: dementia, s/p Left hip hemiarthroplasty 18  Response To Previous Treatment: Not applicable  Family / Caregiver Present: Yes (son )  Referring Practitioner: KATIE Solo CNP  Referral Date : 11/10/18  Diagnosis: fatigue, weakness   Follows Commands: Within Functional Limits  General Comment  Comments: Pt resting in bed in ED  Subjective  Subjective: Pt agreeable to PT , pt reports Left knee shelli and will give out on him.  Son reports that patient has been unable to walk since he left rehab and has been at home since Monday,  son unable to care for patient.    Pain Screening  Patient Currently in Pain: No  Vital Signs  Patient Currently in Pain: No       Orientation  Orientation  Overall Orientation Status: Impaired  Orientation Level: Disoriented to time;Oriented to situation;Oriented to place;Oriented to person  Social/Functional History  Social/Functional History  Lives With: Son  Type of Home: House  Home Layout: Two level, 1/2 bath on main level (stair lift to second floor )  Home Access: Stairs to enter without rails  Entrance Stairs - Number of Steps: 1  Bathroom Shower/Tub: Walk-in shower  Bathroom Toilet: Standard  Bathroom Equipment: Shower chair, Grab bars around toilet  Home Equipment: Rolling walker, 4 wheeled walker, [employer]  ADL Assistance: Needs assistance (Assist required for bathing and dressing )  Homemaking Assistance: Needs assistance  Homemaking Responsibilities: No (family does the cleaning and cooking)  Ambulation Assistance: Needs assistance (Ax1 with RW )  Transfer Assistance: Needs assistance (AX1)  Active : No  Patient's  Info: family drives patient   Cognition        Objective     Observation/Palpation  Posture: Poor (forward flexed posture)  Observation: pt with BUE tremors noted    AROM RLE (degrees)  RLE AROM: WFL  AROM LLE (degrees)  LLE AROM : WFL  Strength RLE  Comment: Grossly 3-/5  Strength LLE  Comment: Grossly 3-/5     Sensation  Overall Sensation Status:  (MANDY d/t impaired cognition)  Bed mobility  Supine to Sit: Maximum assistance  Sit to Supine: Dependent/Total (max AX2)  Scooting: Maximal assistance  Transfers  Sit to Stand: Dependent/Total (max Ax2 from EOB, VCs and hand over hand assist for UE placement (pt with impaired cognition with difficulty following commands) )  Stand to sit: Dependent/Total (max AX2)  Ambulation  Ambulation?: Yes  Ambulation 1  Surface: level tile  Device: Rolling Walker  Assistance: Dependent/Total (max AX2)  Quality of Gait: very wide KAREN, shuffling feet with minimal foot clearance   Distance: 2 steps forward+ 2 steps backwards     Balance  Sitting - Static: Fair;- (min A-mod A)  Sitting - Dynamic: Poor (max A with posterior LOB)  Standing - Static: Poor (heavy posterior lean upon standing)  Standing - Dynamic: Poor  Comments: pt demonstrates no righting reactions to self correct posterior LOBs         Assessment   Body structures, Functions, Activity limitations: Decreased functional mobility ; Decreased balance;Decreased endurance;Decreased safe awareness;Decreased strength  Assessment: PT eval complete. Pt is currently functioning below baseline, requiring max Ax2 to complete bed mobility, transfers and take a few steps at EOB with RW. Pt with poor sitting and standing balance, poor standing tolerance, multiple posterior LOBs while sitting EOB and pushes backwards when standing. Pt with difficulty following instructions and impaired safety awareness. Pt would benefit from skilled PT to address above impairments and in order to return to PLOF. Prognosis: Fair  Decision Making: Medium Complexity  Patient Education: role of PT, acute goals, safety with mobility, DC recs  Barriers to Learning: impaired cognition  REQUIRES PT FOLLOW UP: Yes  Activity Tolerance  Activity Tolerance: Patient limited by cognitive status         Plan   Plan  Times per week: 3-5X/week  Times per day: Daily  Current Treatment Recommendations: ROM, Strengthening, Functional Mobility Training, Transfer Training, Balance Training, Endurance Training, Safety Education & Training, Gait Training, Patient/Caregiver Education & Training, Positioning  Safety Devices  Type of devices: Gait belt, Left in bed, Call light within reach, Nurse notified    G-Code  PT G-Codes  Functional Assessment Tool Used: AM-PAC  Score: 8  Functional Limitation: Mobility: Walking and moving around  Mobility: Walking and Moving Around Current Status ():  At least 80 percent but less than 100 percent impaired, limited or

## 2018-11-10 NOTE — CARE COORDINATION
Spoke to [employer] with [facility] and they can accept from ER if pt is medically clear. Informed RN Rajni Higginbotham and faxed paper SOBEIDA that will need to be signed as orders for SNF.

## 2018-11-10 NOTE — PROGRESS NOTES
Exceptions  Arousal/Alertness: Delayed responses to stimuli  Following Commands: Inconsistently follows commands  Attention Span: Attends with cues to redirect  Memory: Decreased recall of recent events  Safety Judgement: Decreased awareness of need for safety  Problem Solving: Decreased awareness of errors  Insights: Decreased awareness of deficits  Initiation: Requires cues for all  Sequencing: Requires cues for all  Cognition Comment: pt with hx of dementia, unable to follow 1 step commands with increased time and VCs        Sensation  Overall Sensation Status:  (unable to test d/t impaired cog)        LUE AROM (degrees)  LUE AROM : WFL  RUE AROM (degrees)  RUE AROM : WFL  LUE Strength  Gross LUE Strength: WFL  L Hand Grasp: 5/5  L Hand Release: 5/5  RUE Strength  Gross RUE Strength: WFL  R Hand Grasp: 5/5  R Hand Release: 5/5       Assessment   Performance deficits / Impairments: Decreased functional mobility ; Decreased ADL status; Decreased strength;Decreased safe awareness;Decreased cognition;Decreased endurance;Decreased balance;Decreased fine motor control;Decreased coordination  Assessment: OT eval complete. Pt presents with the above deficits impacting independence with ADLs and functional t/fs. Recommend continued skilled OT to safely return to OF. Pt requires Ax2 requiring Max A x2 for bed mobility and t/fs. Pt unable to ambulate with Max A x2 d/t decreased command following, pt with hx of dementia. Cont POC  Prognosis: Fair  Decision Making: High Complexity  Patient Education: Role of OT, safe t/fs, bed mobility, ADLs, DC recommendations, POC  REQUIRES OT FOLLOW UP: Yes  Activity Tolerance  Activity Tolerance: Treatment limited secondary to decreased cognition  Safety Devices  Safety Devices in place: Yes  Type of devices: All fall risk precautions in place;Call light within reach;Gait belt;Patient at risk for falls; Left in bed;Nurse notified         Plan   Plan  Times per week: 3-5x/wk    G-Code  OT

## 2018-11-10 NOTE — ED PROVIDER NOTES
RDW 14.8 12.4 - 15.4 %    Platelets 552 520 - 219 K/uL    MPV 7.9 5.0 - 10.5 fL   Comprehensive Metabolic Panel   Result Value Ref Range    Sodium 138 136 - 145 mmol/L    Potassium 4.0 3.5 - 5.1 mmol/L    Chloride 100 99 - 110 mmol/L    CO2 30 21 - 32 mmol/L    Anion Gap 8 3 - 16    Glucose 154 (H) 70 - 99 mg/dL    BUN 25 (H) 7 - 20 mg/dL    CREATININE 0.7 (L) 0.8 - 1.3 mg/dL    GFR Non-African American >60 >60    GFR African American >60 >60    Calcium 9.0 8.3 - 10.6 mg/dL    Total Protein 6.4 6.4 - 8.2 g/dL    Alb 3.2 (L) 3.4 - 5.0 g/dL    Albumin/Globulin Ratio 1.0 (L) 1.1 - 2.2    Total Bilirubin <0.2 0.0 - 1.0 mg/dL    Alkaline Phosphatase 93 40 - 129 U/L    ALT 10 10 - 40 U/L    AST 12 (L) 15 - 37 U/L    Globulin 3.2 g/dL   Urinalysis, reflex to microscopic   Result Value Ref Range    Color, UA Yellow Straw/Yellow    Clarity, UA Clear Clear    Glucose, Ur Negative Negative mg/dL    Bilirubin Urine Negative Negative    Ketones, Urine Negative Negative mg/dL    Specific Gravity, UA >=1.030 1.005 - 1.030    Blood, Urine Negative Negative    pH, UA 5.5 5.0 - 8.0    Protein, UA Negative Negative mg/dL    Urobilinogen, Urine 0.2 <2.0 E.U./dL    Nitrite, Urine Negative Negative    Leukocyte Esterase, Urine Negative Negative    Microscopic Examination Not Indicated     Urine Type Not Specified    EKG 12 Lead   Result Value Ref Range    Ventricular Rate 81 BPM    Atrial Rate 81 BPM    P-R Interval 242 ms    QRS Duration 84 ms    Q-T Interval 356 ms    QTc Calculation (Bazett) 413 ms    P Axis -1 degrees    R Axis -4 degrees    T Axis 21 degrees    Diagnosis       Sinus rhythm with 1st degree A-V block with Premature supraventricular complexesMinimal voltage criteria for LVH, may be normal variantBorderline ECGWhen compared with ECG of 06-NOV-2018 11:45,Premature supraventricular complexes are now Present       I spoke with Dr. Gaston Rodriguez.  We thoroughly discussed the history, physical exam, laboratory and imaging

## 2018-11-10 NOTE — CARE COORDINATION
CASE MANAGEMENT INITIAL ASSESSMENT      Reviewed chart and met with patient today, re: DCP services  Explained Case Management role/services. Family present: son Tom Levin  Primary contact information: Tom Levin [phone]    Admit date/status: N/A in ER  Diagnosis: N/A in ER    Insurance: Zurn David Arellano required for SNF - Y (potential for placement without pre cert returned)       3 night stay required - N    Living arrangements, Adls, care needs, prior to admission: Pt lives in home with son. Needs max assist with ADLS    Transportation: likely medical    Durable Medical Equipment at home: Walker_x_Cane__RTS__ BSC__Shower Chair__  02__ HHN__ CPAP__  BiPap__  Hospital Bed__ W/C___ Other__________    Services in the home and/or outpatient, prior to admission: [address] was to open case today    PT/OT recs: pending    Hospital Exemption Notification (HEN): not initiated yet    Barriers to discharge: pending    Plan/comments: Consult from ER for potential placement from ER. Spoke to pt son Tom Levin bedside. Pt spent from 9/11 until 11/5 skilled at [facility] then was discharged with [facility]. Son brought pt to ER today because pt unable to ambulate or care for self and requests re placement in to [facility]. Wilder hughes from [facility] states that pt is into co pay days at 167.50/day and son states they are willing to pay that. Faxed [facility] pt demographic sheet and PT/OT recs pending. Pt son does NOT feel pt could do 3 hrs therapy a day for potential ARU placement. DCP following- please [phone] if needed.     ECOC on chart for MD signature

## 2018-11-10 NOTE — PROGRESS NOTES
Case management called @[phone]  Re: Nursing Home Placement per Suzie Brambila CNP   spoke with pt's family and filed note @[phone]

## 2018-11-11 PROCEDURE — 93010 ELECTROCARDIOGRAM REPORT: CPT | Performed by: INTERNAL MEDICINE

## 2018-11-12 ENCOUNTER — TELEPHONE (OUTPATIENT)
Dept: FAMILY MEDICINE CLINIC | Age: 83
End: 2018-11-12

## 2018-11-12 ENCOUNTER — CARE COORDINATION (OUTPATIENT)
Dept: CARE COORDINATION | Age: 83
End: 2018-11-12

## 2018-11-12 LAB
EKG ATRIAL RATE: 81 BPM
EKG DIAGNOSIS: NORMAL
EKG P AXIS: -1 DEGREES
EKG P-R INTERVAL: 242 MS
EKG Q-T INTERVAL: 356 MS
EKG QRS DURATION: 84 MS
EKG QTC CALCULATION (BAZETT): 413 MS
EKG R AXIS: -4 DEGREES
EKG T AXIS: 21 DEGREES
EKG VENTRICULAR RATE: 81 BPM

## 2018-11-12 NOTE — TELEPHONE ENCOUNTER
Noted. Will follow patient at [facility].     [facility] Coordination   D:[phone]  T:[phone]

## 2018-11-14 ENCOUNTER — CARE COORDINATION (OUTPATIENT)
Dept: CARE COORDINATION | Age: 83
End: 2018-11-14

## 2018-12-07 PROBLEM — E86.0 DEHYDRATION: Status: RESOLVED | Noted: 2018-07-04 | Resolved: 2018-12-07

## 2018-12-27 ENCOUNTER — CARE COORDINATION (OUTPATIENT)
Dept: CARE COORDINATION | Age: 83
End: 2018-12-27

## 2019-01-30 ENCOUNTER — TELEPHONE (OUTPATIENT)
Dept: FAMILY MEDICINE CLINIC | Age: 84
End: 2019-01-30

## 2019-02-08 ENCOUNTER — OFFICE VISIT (OUTPATIENT)
Dept: FAMILY MEDICINE CLINIC | Age: 84
End: 2019-02-08
Payer: MEDICARE

## 2019-02-08 VITALS — OXYGEN SATURATION: 88 % | SYSTOLIC BLOOD PRESSURE: 120 MMHG | DIASTOLIC BLOOD PRESSURE: 60 MMHG | HEART RATE: 69 BPM

## 2019-02-08 DIAGNOSIS — G30.1 LATE ONSET ALZHEIMER'S DISEASE WITHOUT BEHAVIORAL DISTURBANCE (HCC): Primary | ICD-10-CM

## 2019-02-08 DIAGNOSIS — F02.80 LATE ONSET ALZHEIMER'S DISEASE WITHOUT BEHAVIORAL DISTURBANCE (HCC): Primary | ICD-10-CM

## 2019-02-08 PROCEDURE — 99214 OFFICE O/P EST MOD 30 MIN: CPT | Performed by: FAMILY MEDICINE

## 2019-02-11 ASSESSMENT — ENCOUNTER SYMPTOMS
COUGH: 0
NAUSEA: 0
COLOR CHANGE: 0
VOMITING: 0
ABDOMINAL PAIN: 0
BACK PAIN: 0
CONSTIPATION: 0
DIARRHEA: 0
TROUBLE SWALLOWING: 0
SHORTNESS OF BREATH: 0
BLOOD IN STOOL: 0

## 2019-02-14 ENCOUNTER — HOSPITAL ENCOUNTER (OUTPATIENT)
Age: 84
Discharge: HOME | End: 2019-02-14
Payer: MEDICARE

## 2019-02-14 ENCOUNTER — HOSPITAL ENCOUNTER (OUTPATIENT)
Dept: MRI IMAGING | Age: 84
Discharge: HOME | End: 2019-02-14
Payer: MEDICARE

## 2019-02-14 DIAGNOSIS — G30.1 LATE ONSET ALZHEIMER'S DISEASE WITHOUT BEHAVIORAL DISTURBANCE (HCC): ICD-10-CM

## 2019-02-14 DIAGNOSIS — F02.80 LATE ONSET ALZHEIMER'S DISEASE WITHOUT BEHAVIORAL DISTURBANCE (HCC): ICD-10-CM

## 2019-02-14 LAB
FOLATE: 11.37 NG/ML (ref 4.78–24.2)
MAGNESIUM: 2.1 MG/DL (ref 1.8–2.4)
VITAMIN B-12: 736 PG/ML (ref 211–911)

## 2019-02-14 PROCEDURE — 83735 ASSAY OF MAGNESIUM: CPT

## 2019-02-14 PROCEDURE — 82746 ASSAY OF FOLIC ACID SERUM: CPT

## 2019-02-14 PROCEDURE — 82607 VITAMIN B-12: CPT

## 2019-02-14 PROCEDURE — 36415 COLL VENOUS BLD VENIPUNCTURE: CPT

## 2019-02-14 PROCEDURE — 70551 MRI BRAIN STEM W/O DYE: CPT

## 2020-01-01 ENCOUNTER — HOSPITAL ENCOUNTER (INPATIENT)
Age: 85
LOS: 1 days | DRG: 871 | End: 2020-03-17
Attending: EMERGENCY MEDICINE | Admitting: INTERNAL MEDICINE
Payer: MEDICARE

## 2020-01-01 ENCOUNTER — APPOINTMENT (OUTPATIENT)
Dept: GENERAL RADIOLOGY | Age: 85
DRG: 871 | End: 2020-01-01
Payer: MEDICARE

## 2020-01-01 VITALS
TEMPERATURE: 99.6 F | BODY MASS INDEX: 21.35 KG/M2 | RESPIRATION RATE: 28 BRPM | SYSTOLIC BLOOD PRESSURE: 104 MMHG | WEIGHT: 132.28 LBS | OXYGEN SATURATION: 96 % | DIASTOLIC BLOOD PRESSURE: 72 MMHG | HEART RATE: 118 BPM

## 2020-01-01 LAB
A/G RATIO: 0.8 (ref 1.1–2.2)
ALBUMIN SERPL-MCNC: 3.2 G/DL (ref 3.4–5)
ALP BLD-CCNC: 70 U/L (ref 40–129)
ALT SERPL-CCNC: 23 U/L (ref 10–40)
ANION GAP SERPL CALCULATED.3IONS-SCNC: 12 MMOL/L (ref 3–16)
AST SERPL-CCNC: 33 U/L (ref 15–37)
BASE EXCESS ARTERIAL: -2.6 MMOL/L (ref -3–3)
BASE EXCESS ARTERIAL: -5.9 MMOL/L (ref -3–3)
BASE EXCESS ARTERIAL: -5.9 MMOL/L (ref -3–3)
BASE EXCESS VENOUS: -6.1 MMOL/L (ref -3–3)
BASOPHILS ABSOLUTE: 0.1 K/UL (ref 0–0.2)
BASOPHILS RELATIVE PERCENT: 0.7 %
BILIRUB SERPL-MCNC: 0.3 MG/DL (ref 0–1)
BUN BLDV-MCNC: 32 MG/DL (ref 7–20)
CALCIUM SERPL-MCNC: 8.7 MG/DL (ref 8.3–10.6)
CARBOXYHEMOGLOBIN ARTERIAL: 0.2 % (ref 0–1.5)
CARBOXYHEMOGLOBIN ARTERIAL: 0.3 % (ref 0–1.5)
CARBOXYHEMOGLOBIN ARTERIAL: 0.3 % (ref 0–1.5)
CARBOXYHEMOGLOBIN: 0.9 % (ref 0–1.5)
CHLORIDE BLD-SCNC: 104 MMOL/L (ref 99–110)
CO2: 29 MMOL/L (ref 21–32)
CREAT SERPL-MCNC: 0.9 MG/DL (ref 0.8–1.3)
EKG ATRIAL RATE: 197 BPM
EKG ATRIAL RATE: 71 BPM
EKG DIAGNOSIS: NORMAL
EKG DIAGNOSIS: NORMAL
EKG Q-T INTERVAL: 244 MS
EKG Q-T INTERVAL: 344 MS
EKG QRS DURATION: 134 MS
EKG QRS DURATION: 86 MS
EKG QTC CALCULATION (BAZETT): 424 MS
EKG QTC CALCULATION (BAZETT): 456 MS
EKG R AXIS: 39 DEGREES
EKG R AXIS: 62 DEGREES
EKG T AXIS: 104 DEGREES
EKG T AXIS: 64 DEGREES
EKG VENTRICULAR RATE: 106 BPM
EKG VENTRICULAR RATE: 182 BPM
EOSINOPHILS ABSOLUTE: 0 K/UL (ref 0–0.6)
EOSINOPHILS RELATIVE PERCENT: 0.5 %
GFR AFRICAN AMERICAN: >60
GFR NON-AFRICAN AMERICAN: >60
GLOBULIN: 3.9 G/DL
GLUCOSE BLD-MCNC: 117 MG/DL (ref 70–99)
GLUCOSE BLD-MCNC: 234 MG/DL (ref 70–99)
GLUCOSE BLD-MCNC: 86 MG/DL (ref 70–99)
HCO3 ARTERIAL: 21.5 MMOL/L (ref 21–29)
HCO3 ARTERIAL: 23 MMOL/L (ref 21–29)
HCO3 ARTERIAL: 23.2 MMOL/L (ref 21–29)
HCO3 VENOUS: 23.7 MMOL/L (ref 23–29)
HCT VFR BLD CALC: 36.9 % (ref 40.5–52.5)
HEMOGLOBIN, ART, EXTENDED: 11.3 G/DL (ref 13.5–17.5)
HEMOGLOBIN, ART, EXTENDED: 11.8 G/DL (ref 13.5–17.5)
HEMOGLOBIN, ART, EXTENDED: 12.6 G/DL (ref 13.5–17.5)
HEMOGLOBIN: 12 G/DL (ref 13.5–17.5)
LACTIC ACID, SEPSIS: 1.7 MMOL/L (ref 0.4–1.9)
LACTIC ACID, SEPSIS: 2.1 MMOL/L (ref 0.4–1.9)
LACTIC ACID, SEPSIS: 2.7 MMOL/L (ref 0.4–1.9)
LACTIC ACID, SEPSIS: 3.5 MMOL/L (ref 0.4–1.9)
LYMPHOCYTES ABSOLUTE: 2.3 K/UL (ref 1–5.1)
LYMPHOCYTES RELATIVE PERCENT: 24 %
MCH RBC QN AUTO: 30.7 PG (ref 26–34)
MCHC RBC AUTO-ENTMCNC: 32.4 G/DL (ref 31–36)
MCV RBC AUTO: 94.8 FL (ref 80–100)
METHEMOGLOBIN ARTERIAL: 0.1 %
METHEMOGLOBIN ARTERIAL: 0.3 %
METHEMOGLOBIN ARTERIAL: 0.3 %
METHEMOGLOBIN VENOUS: 0.3 %
MONOCYTES ABSOLUTE: 0.3 K/UL (ref 0–1.3)
MONOCYTES RELATIVE PERCENT: 3.6 %
NEUTROPHILS ABSOLUTE: 6.7 K/UL (ref 1.7–7.7)
NEUTROPHILS RELATIVE PERCENT: 71.2 %
O2 CONTENT ARTERIAL: 15 ML/DL
O2 CONTENT ARTERIAL: 15 ML/DL
O2 CONTENT ARTERIAL: 16 ML/DL
O2 CONTENT, VEN: 15 VOL %
O2 SAT, ARTERIAL: 82.6 %
O2 SAT, ARTERIAL: 92.6 %
O2 SAT, ARTERIAL: 94.2 %
O2 SAT, VEN: 93 %
O2 THERAPY: ABNORMAL
ORGANISM: ABNORMAL
PCO2 ARTERIAL: 43.2 MMHG (ref 35–45)
PCO2 ARTERIAL: 50.5 MMHG (ref 35–45)
PCO2 ARTERIAL: 64 MMHG (ref 35–45)
PCO2, VEN: 70.4 MMHG (ref 40–50)
PDW BLD-RTO: 15.3 % (ref 12.4–15.4)
PERFORMED ON: ABNORMAL
PERFORMED ON: NORMAL
PH ARTERIAL: 7.18 (ref 7.35–7.45)
PH ARTERIAL: 7.25 (ref 7.35–7.45)
PH ARTERIAL: 7.34 (ref 7.35–7.45)
PH VENOUS: 7.14 (ref 7.35–7.45)
PLATELET # BLD: 273 K/UL (ref 135–450)
PMV BLD AUTO: 8.4 FL (ref 5–10.5)
PO2 ARTERIAL: 54.2 MMHG (ref 75–108)
PO2 ARTERIAL: 74.5 MMHG (ref 75–108)
PO2 ARTERIAL: 81.1 MMHG (ref 75–108)
PO2, VEN: 83.7 MMHG (ref 25–40)
POTASSIUM REFLEX MAGNESIUM: 4.1 MMOL/L (ref 3.5–5.1)
PRO-BNP: 1178 PG/ML (ref 0–449)
PROCALCITONIN: 0.11 NG/ML (ref 0–0.15)
RAPID INFLUENZA  B AGN: NEGATIVE
RAPID INFLUENZA A AGN: NEGATIVE
RBC # BLD: 3.89 M/UL (ref 4.2–5.9)
REPORT: NORMAL
RESPIRATORY PANEL PCR: ABNORMAL
SODIUM BLD-SCNC: 145 MMOL/L (ref 136–145)
TCO2 ARTERIAL: 23.1 MMOL/L
TCO2 ARTERIAL: 24.4 MMOL/L
TCO2 ARTERIAL: 25.2 MMOL/L
TCO2 CALC VENOUS: 26 MMOL/L
TOTAL PROTEIN: 7.1 G/DL (ref 6.4–8.2)
TROPONIN: 0.02 NG/ML
WBC # BLD: 9.4 K/UL (ref 4–11)

## 2020-01-01 PROCEDURE — 6360000002 HC RX W HCPCS: Performed by: HOSPITALIST

## 2020-01-01 PROCEDURE — 37799 UNLISTED PX VASCULAR SURGERY: CPT

## 2020-01-01 PROCEDURE — 2580000003 HC RX 258: Performed by: HOSPITALIST

## 2020-01-01 PROCEDURE — 94761 N-INVAS EAR/PLS OXIMETRY MLT: CPT

## 2020-01-01 PROCEDURE — 2580000003 HC RX 258: Performed by: INTERNAL MEDICINE

## 2020-01-01 PROCEDURE — 36556 INSERT NON-TUNNEL CV CATH: CPT

## 2020-01-01 PROCEDURE — 80053 COMPREHEN METABOLIC PANEL: CPT

## 2020-01-01 PROCEDURE — 99223 1ST HOSP IP/OBS HIGH 75: CPT | Performed by: INTERNAL MEDICINE

## 2020-01-01 PROCEDURE — 85025 COMPLETE CBC W/AUTO DIFF WBC: CPT

## 2020-01-01 PROCEDURE — 31500 INSERT EMERGENCY AIRWAY: CPT

## 2020-01-01 PROCEDURE — 93005 ELECTROCARDIOGRAM TRACING: CPT | Performed by: EMERGENCY MEDICINE

## 2020-01-01 PROCEDURE — 6360000002 HC RX W HCPCS: Performed by: INTERNAL MEDICINE

## 2020-01-01 PROCEDURE — 02HV33Z INSERTION OF INFUSION DEVICE INTO SUPERIOR VENA CAVA, PERCUTANEOUS APPROACH: ICD-10-PCS | Performed by: EMERGENCY MEDICINE

## 2020-01-01 PROCEDURE — 83880 ASSAY OF NATRIURETIC PEPTIDE: CPT

## 2020-01-01 PROCEDURE — 87205 SMEAR GRAM STAIN: CPT

## 2020-01-01 PROCEDURE — 99291 CRITICAL CARE FIRST HOUR: CPT

## 2020-01-01 PROCEDURE — 96375 TX/PRO/DX INJ NEW DRUG ADDON: CPT

## 2020-01-01 PROCEDURE — 84484 ASSAY OF TROPONIN QUANT: CPT

## 2020-01-01 PROCEDURE — 87150 DNA/RNA AMPLIFIED PROBE: CPT

## 2020-01-01 PROCEDURE — 6360000002 HC RX W HCPCS

## 2020-01-01 PROCEDURE — 6360000002 HC RX W HCPCS: Performed by: EMERGENCY MEDICINE

## 2020-01-01 PROCEDURE — 2500000003 HC RX 250 WO HCPCS: Performed by: EMERGENCY MEDICINE

## 2020-01-01 PROCEDURE — 5A1935Z RESPIRATORY VENTILATION, LESS THAN 24 CONSECUTIVE HOURS: ICD-10-PCS | Performed by: INTERNAL MEDICINE

## 2020-01-01 PROCEDURE — 93010 ELECTROCARDIOGRAM REPORT: CPT | Performed by: INTERNAL MEDICINE

## 2020-01-01 PROCEDURE — 86403 PARTICLE AGGLUT ANTBDY SCRN: CPT

## 2020-01-01 PROCEDURE — 87186 SC STD MICRODIL/AGAR DIL: CPT

## 2020-01-01 PROCEDURE — 84145 PROCALCITONIN (PCT): CPT

## 2020-01-01 PROCEDURE — 96374 THER/PROPH/DIAG INJ IV PUSH: CPT

## 2020-01-01 PROCEDURE — 87040 BLOOD CULTURE FOR BACTERIA: CPT

## 2020-01-01 PROCEDURE — 0BH17EZ INSERTION OF ENDOTRACHEAL AIRWAY INTO TRACHEA, VIA NATURAL OR ARTIFICIAL OPENING: ICD-10-PCS | Performed by: INTERNAL MEDICINE

## 2020-01-01 PROCEDURE — 71045 X-RAY EXAM CHEST 1 VIEW: CPT

## 2020-01-01 PROCEDURE — 2000000000 HC ICU R&B

## 2020-01-01 PROCEDURE — 36415 COLL VENOUS BLD VENIPUNCTURE: CPT

## 2020-01-01 PROCEDURE — 2580000003 HC RX 258: Performed by: EMERGENCY MEDICINE

## 2020-01-01 PROCEDURE — 87070 CULTURE OTHR SPECIMN AEROBIC: CPT

## 2020-01-01 PROCEDURE — 94002 VENT MGMT INPAT INIT DAY: CPT

## 2020-01-01 PROCEDURE — 2700000000 HC OXYGEN THERAPY PER DAY

## 2020-01-01 PROCEDURE — 83605 ASSAY OF LACTIC ACID: CPT

## 2020-01-01 PROCEDURE — 2500000003 HC RX 250 WO HCPCS: Performed by: HOSPITALIST

## 2020-01-01 PROCEDURE — 99291 CRITICAL CARE FIRST HOUR: CPT | Performed by: INTERNAL MEDICINE

## 2020-01-01 PROCEDURE — 92950 HEART/LUNG RESUSCITATION CPR: CPT

## 2020-01-01 PROCEDURE — 0100U HC RESPIRPTHGN MULT REV TRANS & AMP PRB TECH 21 TRGT: CPT

## 2020-01-01 PROCEDURE — 6370000000 HC RX 637 (ALT 250 FOR IP): Performed by: INTERNAL MEDICINE

## 2020-01-01 PROCEDURE — 82803 BLOOD GASES ANY COMBINATION: CPT

## 2020-01-01 PROCEDURE — 87077 CULTURE AEROBIC IDENTIFY: CPT

## 2020-01-01 PROCEDURE — 36620 INSERTION CATHETER ARTERY: CPT

## 2020-01-01 PROCEDURE — 87804 INFLUENZA ASSAY W/OPTIC: CPT

## 2020-01-01 RX ORDER — DILTIAZEM HYDROCHLORIDE 5 MG/ML
INJECTION INTRAVENOUS
Status: DISPENSED
Start: 2020-01-01 | End: 2020-01-01

## 2020-01-01 RX ORDER — LORAZEPAM 2 MG/ML
1 INJECTION INTRAMUSCULAR
Status: DISCONTINUED | OUTPATIENT
Start: 2020-01-01 | End: 2020-03-18 | Stop reason: HOSPADM

## 2020-01-01 RX ORDER — TRAZODONE HYDROCHLORIDE 50 MG/1
50 TABLET ORAL NIGHTLY
COMMUNITY

## 2020-01-01 RX ORDER — IPRATROPIUM BROMIDE AND ALBUTEROL SULFATE 2.5; .5 MG/3ML; MG/3ML
1 SOLUTION RESPIRATORY (INHALATION)
Status: DISCONTINUED | OUTPATIENT
Start: 2020-01-01 | End: 2020-01-01

## 2020-01-01 RX ORDER — PROMETHAZINE HYDROCHLORIDE 25 MG/1
12.5 TABLET ORAL EVERY 6 HOURS PRN
Status: DISCONTINUED | OUTPATIENT
Start: 2020-01-01 | End: 2020-03-18 | Stop reason: HOSPADM

## 2020-01-01 RX ORDER — MORPHINE SULFATE 2 MG/ML
2 INJECTION, SOLUTION INTRAMUSCULAR; INTRAVENOUS
Status: DISCONTINUED | OUTPATIENT
Start: 2020-01-01 | End: 2020-03-18 | Stop reason: HOSPADM

## 2020-01-01 RX ORDER — WHITE PETROLATUM,ZINC OXIDE 57; 17 G/100G; G/100G
PASTE TOPICAL
COMMUNITY

## 2020-01-01 RX ORDER — ACETAMINOPHEN 325 MG/1
650 TABLET ORAL EVERY 6 HOURS PRN
Status: DISCONTINUED | OUTPATIENT
Start: 2020-01-01 | End: 2020-03-18 | Stop reason: HOSPADM

## 2020-01-01 RX ORDER — SERTRALINE HYDROCHLORIDE 25 MG/1
25 TABLET, FILM COATED ORAL DAILY
COMMUNITY

## 2020-01-01 RX ORDER — LANOLIN ALCOHOL/MO/W.PET/CERES
3 CREAM (GRAM) TOPICAL NIGHTLY
COMMUNITY

## 2020-01-01 RX ORDER — SODIUM CHLORIDE 0.9 % (FLUSH) 0.9 %
10 SYRINGE (ML) INJECTION EVERY 12 HOURS SCHEDULED
Status: DISCONTINUED | OUTPATIENT
Start: 2020-01-01 | End: 2020-03-18 | Stop reason: HOSPADM

## 2020-01-01 RX ORDER — SODIUM CHLORIDE 0.9 % (FLUSH) 0.9 %
10 SYRINGE (ML) INJECTION PRN
Status: DISCONTINUED | OUTPATIENT
Start: 2020-01-01 | End: 2020-03-18 | Stop reason: HOSPADM

## 2020-01-01 RX ORDER — PROPOFOL 10 MG/ML
10 INJECTION, EMULSION INTRAVENOUS
Status: DISCONTINUED | OUTPATIENT
Start: 2020-01-01 | End: 2020-03-18 | Stop reason: HOSPADM

## 2020-01-01 RX ORDER — ACETAMINOPHEN 650 MG/1
650 SUPPOSITORY RECTAL EVERY 6 HOURS PRN
Status: DISCONTINUED | OUTPATIENT
Start: 2020-01-01 | End: 2020-03-18 | Stop reason: HOSPADM

## 2020-01-01 RX ORDER — FENTANYL CITRATE 50 UG/ML
50 INJECTION, SOLUTION INTRAMUSCULAR; INTRAVENOUS
Status: DISCONTINUED | OUTPATIENT
Start: 2020-01-01 | End: 2020-03-18 | Stop reason: HOSPADM

## 2020-01-01 RX ORDER — SODIUM CHLORIDE 9 MG/ML
INJECTION, SOLUTION INTRAVENOUS CONTINUOUS
Status: DISCONTINUED | OUTPATIENT
Start: 2020-01-01 | End: 2020-03-18 | Stop reason: HOSPADM

## 2020-01-01 RX ORDER — ACETAMINOPHEN 325 MG/1
650 TABLET ORAL EVERY 6 HOURS PRN
COMMUNITY

## 2020-01-01 RX ORDER — TRAMADOL HYDROCHLORIDE 50 MG/1
50 TABLET ORAL EVERY 6 HOURS PRN
COMMUNITY

## 2020-01-01 RX ORDER — SODIUM CHLORIDE 9 MG/ML
10 INJECTION INTRAVENOUS DAILY
Status: DISCONTINUED | OUTPATIENT
Start: 2020-01-01 | End: 2020-01-01 | Stop reason: SDUPTHER

## 2020-01-01 RX ORDER — PROPOFOL 10 MG/ML
INJECTION, EMULSION INTRAVENOUS
Status: COMPLETED
Start: 2020-01-01 | End: 2020-01-01

## 2020-01-01 RX ORDER — ONDANSETRON 2 MG/ML
4 INJECTION INTRAMUSCULAR; INTRAVENOUS EVERY 6 HOURS PRN
Status: DISCONTINUED | OUTPATIENT
Start: 2020-01-01 | End: 2020-03-18 | Stop reason: HOSPADM

## 2020-01-01 RX ORDER — FERROUS SULFATE 325(65) MG
325 TABLET ORAL 2 TIMES DAILY
COMMUNITY

## 2020-01-01 RX ORDER — SODIUM CHLORIDE 9 MG/ML
500 INJECTION, SOLUTION INTRAVENOUS CONTINUOUS PRN
Status: DISCONTINUED | OUTPATIENT
Start: 2020-01-01 | End: 2020-03-18 | Stop reason: HOSPADM

## 2020-01-01 RX ORDER — PROPOFOL 10 MG/ML
10 INJECTION, EMULSION INTRAVENOUS
Status: DISCONTINUED | OUTPATIENT
Start: 2020-01-01 | End: 2020-01-01 | Stop reason: DRUGHIGH

## 2020-01-01 RX ORDER — PANTOPRAZOLE SODIUM 40 MG/10ML
40 INJECTION, POWDER, LYOPHILIZED, FOR SOLUTION INTRAVENOUS DAILY
Status: DISCONTINUED | OUTPATIENT
Start: 2020-01-01 | End: 2020-01-01

## 2020-01-01 RX ORDER — POLYETHYLENE GLYCOL 3350 17 G/17G
17 POWDER, FOR SOLUTION ORAL DAILY PRN
Status: DISCONTINUED | OUTPATIENT
Start: 2020-01-01 | End: 2020-03-18 | Stop reason: HOSPADM

## 2020-01-01 RX ADMIN — NOREPINEPHRINE BITARTRATE 12 MCG/MIN: 1 INJECTION INTRAVENOUS at 11:54

## 2020-01-01 RX ADMIN — SODIUM CHLORIDE 500 ML: 9 INJECTION, SOLUTION INTRAVENOUS at 13:08

## 2020-01-01 RX ADMIN — PROPOFOL 5 MCG/KG/MIN: 10 INJECTION, EMULSION INTRAVENOUS at 04:21

## 2020-01-01 RX ADMIN — PROPOFOL 25 MCG/KG/MIN: 10 INJECTION, EMULSION INTRAVENOUS at 10:10

## 2020-01-01 RX ADMIN — NOREPINEPHRINE BITARTRATE 4 MCG/MIN: 1 INJECTION INTRAVENOUS at 05:21

## 2020-01-01 RX ADMIN — SODIUM CHLORIDE 500 ML: 9 INJECTION, SOLUTION INTRAVENOUS at 15:19

## 2020-01-01 RX ADMIN — SODIUM CHLORIDE 500 ML: 9 INJECTION, SOLUTION INTRAVENOUS at 14:30

## 2020-01-01 RX ADMIN — SODIUM CHLORIDE 500 ML: 9 INJECTION, SOLUTION INTRAVENOUS at 12:25

## 2020-01-01 RX ADMIN — MUPIROCIN: 20 OINTMENT TOPICAL at 12:06

## 2020-01-01 RX ADMIN — SODIUM CHLORIDE 500 ML: 9 INJECTION, SOLUTION INTRAVENOUS at 10:40

## 2020-01-01 RX ADMIN — MORPHINE SULFATE 2 MG: 2 INJECTION, SOLUTION INTRAMUSCULAR; INTRAVENOUS at 20:06

## 2020-01-01 RX ADMIN — VANCOMYCIN HYDROCHLORIDE 1000 MG: 1 INJECTION, POWDER, LYOPHILIZED, FOR SOLUTION INTRAVENOUS at 05:51

## 2020-01-01 RX ADMIN — PROPOFOL 25 MCG/KG/MIN: 10 INJECTION, EMULSION INTRAVENOUS at 18:28

## 2020-01-01 RX ADMIN — CEFEPIME HYDROCHLORIDE 1 G: 1 INJECTION, POWDER, FOR SOLUTION INTRAMUSCULAR; INTRAVENOUS at 05:12

## 2020-01-01 RX ADMIN — SODIUM CHLORIDE 500 ML: 9 INJECTION, SOLUTION INTRAVENOUS at 11:41

## 2020-01-01 RX ADMIN — ENOXAPARIN SODIUM 40 MG: 40 INJECTION SUBCUTANEOUS at 12:06

## 2020-01-01 RX ADMIN — SODIUM CHLORIDE: 9 INJECTION, SOLUTION INTRAVENOUS at 08:14

## 2020-01-01 RX ADMIN — FENTANYL CITRATE 50 MCG: 50 INJECTION, SOLUTION INTRAMUSCULAR; INTRAVENOUS at 10:40

## 2020-01-01 ASSESSMENT — PAIN SCALES - GENERAL
PAINLEVEL_OUTOF10: 0
PAINLEVEL_OUTOF10: 7

## 2020-01-01 ASSESSMENT — PULMONARY FUNCTION TESTS
PIF_VALUE: 24
PIF_VALUE: 26
PIF_VALUE: 27
PIF_VALUE: 24
PIF_VALUE: 24
PIF_VALUE: 17
PIF_VALUE: 25
PIF_VALUE: 23
PIF_VALUE: 22
PIF_VALUE: 24
PIF_VALUE: 20
PIF_VALUE: 24
PIF_VALUE: 18
PIF_VALUE: 25
PIF_VALUE: 34
PIF_VALUE: 26
PIF_VALUE: 24
PIF_VALUE: 25
PIF_VALUE: 25
PIF_VALUE: 24

## 2020-03-17 PROBLEM — J15.212 PNEUMONIA OF RIGHT LOWER LOBE DUE TO METHICILLIN RESISTANT STAPHYLOCOCCUS AUREUS (MRSA) (HCC): Status: ACTIVE | Noted: 2020-01-01

## 2020-03-17 NOTE — ED NOTES
After ABG resulted Dr. Leida Foreman gave verbal order for increase of Resp on vent from 16 to 18. Vent setting changed.       Jordan Schofield RN  03/17/20 [id number]

## 2020-03-17 NOTE — ED NOTES
PT arrives in ER from NH in respiratory distress. Pt unable to commumicate, pt with fluidy respirations. Pt 85 - 89% on NRB. /92, , RR 32. Pt is full code per ems and NH.       Lindsay Lee RN  03/17/20 [id number]

## 2020-03-17 NOTE — ED PROVIDER NOTES
nebulizer solution Inhale 1 vial into the lungs every 6 hours as needed for Shortness of Breath      guaiFENesin (ROBITUSSIN) 100 MG/5ML syrup Take 100 mg by mouth 4 times daily as needed for Cough      Cholecalciferol (VITAMIN D3) 5000 units TABS Take 1 tablet by mouth daily 1 tab by mouthg starting on the 20th of every month and ending on the 20th of every month for prophylaxis      montelukast (SINGULAIR) 10 MG tablet Take 10 mg by mouth nightly      mirtazapine (REMERON) 15 MG tablet Take 15 mg by mouth nightly      albuterol sulfate HFA (PROAIR HFA) 108 (90 Base) MCG/ACT inhaler Use 1-2 puffs every 4 hours while awake for 7-10 days then PRN wheezing  Dispense with SPACER and Instruct on use. May sub Ventolin or Proventil as needed per [employer]. 1 Inhaler 0    donepezil (ARICEPT) 5 MG tablet Take 5 mg by mouth nightly      hydrochlorothiazide (HYDRODIURIL) 25 MG tablet Take 12.5 mg by mouth daily       acetaminophen (TYLENOL) 500 MG tablet Take 500 mg by mouth every 6 hours as needed for Pain      aspirin 81 MG tablet Take 81 mg by mouth daily       Allergies   Allergen Reactions    Claritin [Loratadine] Other (See Comments) and Rash     nightmares    Oxycodone-Acetaminophen Rash       REVIEW OF SYSTEMS  Unable to obtain ROS due to patient's serious medical condition. PHYSICAL EXAM  ED Triage Vitals   Enc Vitals Group      BP 03/17/20 0401 (!) 74/47      Pulse 03/17/20 0354 84      Resp 03/17/20 0354 15      Temp 03/17/20 0518 98.3 °F (36.8 °C)      Temp Source 03/17/20 0518 Axillary      SpO2 03/17/20 0354 (!) 81 %      Weight 03/17/20 0413 132 lb 4.4 oz (60 kg)      Height --       Head Circumference --       Peak Flow --       Pain Score --       Pain Loc --       Pain Edu? --       Excl. in [address]? --      General appearance: 80 y.o. male, frail, appears ill/toxic  HENT: Normocephalic. Atraumatic. NRB in place. Mucus membranes slightly tacky. Neck: Supple. No meningismus. Eyes: PERRL. COURSE/MDM  Patient seen and evaluated. Old records reviewed. Labs and imaging reviewed and results discussed with patient/family to extent possible. This is a 40-year-old male who presents with acute hypoxic respiratory failure and is found to have multifocal pneumonia. On arrival the patient is in respiratory distress. Despite nonrebreather at flush rate and placement of a nasopharyngeal airway in the right nostril, his hypoxia does not resolve. He is altered and there is concerned that he is not protecting his airway and that it would not be safe to place the patient on BiPAP. Moreover, there is concern for the possibility of COVID-19 and to prevent aerosolization would not place patient on BiPAP. The patient was intubated without complication. His hypoxia resolved on the ventilator. Following intubation the patient did develop hypotension. IV fluids were administered however the patient remained hypotensive. He went into cardiac arrest and ACLS was initiated. He was administered 1 mg of epinephrine. After 1 round of CPR ROSC was achieved. He was then noted to be in a tachycardic supraventricular rhythm. This was favored to represent an unstable SVT. Agonized cardioversion was attempted at 150 J and then at 200 J without improvement in the rate. An EKG was obtained and the rhythm looked most consistent with atrial fibrillation with rapid ventricular response. Administered 15 mg diltiazem bolus with improvement in heart rate from the 180s to the 120s. The patient was also administered several aliquots of push dose epinephrine. Ultimately, the blood pressure did stabilize. Additional efforts at rate control were not pursued as the tachycardia is favored to potentially be compensatory. A Levophed drip was started peripherally. I did place a right IJ central venous catheter without complication and the Levophed drip was transitioned to the central line.   I also placed a left radial arterial MG/100ML injection (has no administration in time range)   norepinephrine (LEVOPHED) 16 mg in dextrose 5 % 250 mL infusion (has no administration in time range)   vancomycin 1000 mg IVPB in 250 mL D5W addavial (has no administration in time range)   cefepime (MAXIPIME) 1 g IVPB minibag (has no administration in time range)        All questions were answered and the patient/family expressed understanding and agreement with the plan. PROCEDURES  Intubation  Date/Time: 3/17/2020 5:46 AM  Performed by: Sahra Beebe MD  Authorized by: Sahra Beebe MD     Consent:     Consent obtained:  Emergent situation  Pre-procedure details:     Patient status:  Altered mental status    Mallampati score: I    Pretreatment meds: etomidate. Paralytics:  Rocuronium  Procedure details:     Preoxygenation:  Nonrebreather mask (BVM periprocedure)    CPR in progress: no      Intubation method:  Oral    Oral intubation technique:  Video-assisted    Laryngoscope size: glidescope 3. Tube size (mm):  7.5    Tube type:  Cuffed    Number of attempts:  1    Ventilation between attempts: no      Cricoid pressure: no      Tube visualized through cords: yes    Placement assessment:     ETT to lip:  23    Tube secured with:  ETT orosco    Breath sounds:  Equal and absent over the epigastrium    Placement verification: chest rise, CXR verification, equal breath sounds and ETCO2 detector      CXR findings:  ETT in proper place  Post-procedure details:     Patient tolerance of procedure: Tolerated well, no immediate complications  Central Line  Date/Time: 3/17/2020 5:47 AM  Performed by: Sahra Beebe MD  Authorized by: Sahra Beebe MD     Consent:     Consent obtained:  Emergent situation  Pre-procedure details:     Hand hygiene: Hand hygiene performed prior to insertion      Sterile barrier technique:  All elements of maximal sterile technique followed      Skin preparation:  2% chlorhexidine    Skin preparation agent: Skin preparation agent completely dried prior to procedure    Sedation:     Sedation type: intubated/sedated. Anesthesia (see MAR for exact dosages): Anesthesia method:  None  Procedure details:     Location:  R internal jugular    Site selection rationale: Favorable anatomy    Patient position:  Trendelenburg    Procedural supplies:  Triple lumen    Catheter size:  7 Fr    Landmarks identified: yes      Ultrasound guidance: yes      Sterile ultrasound techniques: Sterile gel and sterile probe covers were used      Number of attempts:  1    Successful placement: yes    Post-procedure details:     Post-procedure:  Dressing applied and line sutured    Assessment:  Blood return through all ports, no pneumothorax on x-ray, placement verified by x-ray and free fluid flow    Patient tolerance of procedure: Tolerated well, no immediate complications  Comments:      PROCEDURE PERFORMED UNDER ULTRASOUND GUIDANCE WITH IMAGES SAVED TO ULTRASOUND MACHINE  Arterial Line  Date/Time: 3/17/2020 5:48 AM  Performed by: Lenora Banegas MD  Authorized by: Lenora Banegas MD     Consent:     Consent obtained:  Emergent situation  Indications:     Indications: hemodynamic monitoring and multiple ABGs    Pre-procedure details:     Skin preparation:  2% Chlorhexidine    Preparation: Patient was prepped and draped in sterile fashion    Sedation:     Sedation type: intubated/sedated. Anesthesia (see MAR for exact dosages): Anesthesia method:  None  Procedure details:     Location:  L radial    Needle gauge:  18 G    Placement technique:  Seldinger    Number of attempts:  1  Post-procedure details:     Post-procedure:  Sterile dressing applied, sutured and wrist guard applied    CMS:  Normal    Patient tolerance of procedure:   Tolerated well, no immediate complications  Comments:      No arterial line setup apparently available in ED therefore waveform not immediately transduced      Bedside Ultrasound Cardiac Exam:    Indication: hypotension    General cardiac activity: hyper-dynamic  Pericardial effusion: Absent   Right heart strain: not assessed  IVC: dilated    Impression: hyperdynamic LV without effusion    Images obtained and read by Rashmi Sosa MD .  Images saved to ultrasound machine. CRITICAL CARE  CRITICAL CARE TIME:  Total critical care time provided today was at least 35 minutes. This excludes seperately billable procedures. Critical care time was provided for multifocal pneumonia and acute hypoxic respiratory failure that required close evaluation and/or intervention with concern for potential patient decompensation. CLINICAL IMPRESSION  1. Multifocal pneumonia    2. Acute respiratory failure with hypoxia (HCC)    3. NSTEMI (non-ST elevated myocardial infarction) [facility])        DISPOSITION   ICU      Rashmi Sosa MD    Note: This chart was created using voice recognition dictation software. Efforts were made by me to ensure accuracy, however some errors may be present due to limitations of this technology and occasionally words are not transcribed correctly.        Rashmi Sosa MD  03/17/20 7896

## 2020-03-17 NOTE — ED NOTES
Verbal orders for 2nd sync shock. Pt remains in SVT at 185. Second sync shock delivered. EKG ordered.       Houston Huertas RN  03/17/20 4644

## 2020-03-17 NOTE — H&P
Hospital Medicine History & Physical      PCP: Tyree Castillo MD    Date of Admission: 3/17/2020    Date of Service: Pt seen/examined on 3/17/2020    Chief Complaint:    Chief Complaint   Patient presents with    Respiratory Distress     Pt arrived to ER via EMS from [facility] in resp distress. Sat 80% on NRB.  Respiratory Arrest     History Of Present Illness: The patient is a 80 y.o. male with dementia, HTN, arthritis who presented to [facility] ED with complaint of respiratory distress from his SNF. Patient is unable to provide any history and therefore history was obtained via ED providers note. Patient was brought to the ED with acute hypoxia at 80% on a nonrebreather. Is quickly decided the patient required emergent intubation and this was performed. Patient became quite hypotensive after intubation and subsequently went into PEA arrest.  1 round of epi and CPR performed with ROSC subsequent on stable SVT. Cardioversion attempted without improvement and EKG showed atrial fibrillation with RVR. Patient was given a diltiazem bolus with improved heart rates from 180s down to 120s. Blood pressure was still low and Levophed was started via peripheral line until central venous catheter was placed. Chest x-ray showed concerns for multifocal pneumonia. Patient was given empiric antibiotics in the ED which were continued on admission. Patient was admitted to the ICU for further care as he was this a full code.      Past Medical History:        Diagnosis Date    Allergic rhinitis     Basal cell carcinoma of right ear 6/17/2017    Bilateral low back pain without sciatica 3/30/2016    Dementia ([facility] [address]     Hypertension     OA (osteoarthritis) 8/13/2018       Past Surgical History:        Procedure Laterality Date    APPENDECTOMY      [place] [address] INCL FLUOR GDNCE DX W/CELL WASHG SPX N/A 9/6/2018    BRONCHOSCOPY performed by Humberto Carmona MD at [facility] OFFICE/OUTPT rhonchi. +vent  CV: Irregular rate and rhythm. No murmur. No rub. No edema. Capillary Refill: Brisk,< 3 seconds   Peripheral Pulses: +2 palpable, equal bilaterally   GI: Non-tender. Non-distended. Normal bowel sounds. Skin: Warm and dry. M/S: diffuse muscle wasting   Neuro: sedated on vent     CBC:   Recent Labs     03/17/20 0345   WBC 9.4   HGB 12.0*   HCT 36.9*   MCV 94.8        BMP:   Recent Labs     03/17/20 0345      K 4.1      CO2 29   BUN 32*   CREATININE 0.9     LIVER PROFILE:   Recent Labs     03/17/20 0345   AST 33   ALT 23   BILITOT 0.3   ALKPHOS 70     CARDIAC ENZYMES  Recent Labs     03/17/20 0345   TROPONINI 0.02*     Procalcitonin 0.11      Lactic Acid, Sepsis 2.1High       Pro-BNP 1,178High        CULTURES  Resp Panel:   Organism Abnormal  03/17/2020  4:25 AM [facility]   Human Metapneumovirus by PCR    Rapid Flu: negative   Legionella Antigen: pending   Strep pneumoniae: pending     EKG:  I have reviewed the EKG with the following interpretation:   Atrial fibrillation with rapid ventricular response, Non-specific intra-ventricular conduction block, Nonspecific T wave abnormality    RADIOLOGY  XR CHEST PORTABLE   Final Result   1. Right central venous catheter tip overlies the SVC, without pneumothorax. 2. Stable appearance of bibasilar pneumonia. XR CHEST PORTABLE   Final Result   1. Endotracheal tube in appropriate position. 2. Enteric tube in the distal esophagus or possible hiatal hernia. 3. Bilateral multifocal consolidative changes consistent with multifocal   pneumonia. Pertinent previous results reviewed   None          MIKE Collado have reviewed the chart on Matilda Ireland and personally interviewed and examined patient, reviewed the data (labs and imaging) and after discussion with my PA formulated the plan. Agree with note with the following edits.     HPI:    81 yo male from [place] presented to ER with SOB, hypoxia, acute resp distress, intubated . Went into cardiac arrest, S/P CPR. admitted to ICU    unresponsive , on vent    family planning On withdrawal of care     I reviewed the patient's Past Medical History, Past Surgical History, Medications, and Allergies. Physical exam:    BP 90/66   Pulse 124   Temp 100.4 °F (38 °C)   Resp 28   Wt 132 lb 4.4 oz (60 kg)   SpO2 91%   BMI 21.35 kg/m²   Gen: intubated, on mech vent  . sedated  Eyes: PERRL. No sclera icterus. No conjunctival injection. ENT: No discharge. Pharynx clear. Neck: Trachea midline. Normal thyroid. Resp: No accessory muscle use. No crackles. No wheezes. No rhonchi. No dullness on percussion. CV: Regular rate. Regular rhythm. No murmur or rub. No edema. GI: Non-tender. Non-distended. No masses. No organomegaly. Normal bowel sounds. No hernia. Skin: Warm and dry. No nodule on exposed extremities. No rash on exposed extremities. Lymph: No cervical LAD. No supraclavicular LAD. M/S: No cyanosis. No joint deformity. No clubbing. Neuro: cannot assess  Psych:  No anxiety or agitation.        Electronically signed by Marita Sullivan MD on 3/17/2020 2:52 PM       ASSESSMENT/PLAN:  S/P PEA Arrest   - arrived with hypoxia, emergently intubated and became hypotensive after intubation  - Received IVF without improvement in BP and went in to cardiac arrest  - ROSC achieved after 1 round of CPR and epi   - Subsequently noted to be in unstable SVT and cardioversion attempted without improvement-->EKG with atrial fibrillation, diltiazem given and HR improved  - Admitted to ICU     Septic Shock (POA--hypotension, tachycardia, tachpnea, LA, source)   - 2/2 multifocal PNA  - Given IVF in ED  - Required CVC with Levophed to maintain BP-->s/p PEA arrest   - Continue IV ABx--Vanc/Cefepime--see below   - Blood, resp cx pending      Acute hypoxic and hypercapneic respiratory failure  - arrived from SNF with hypoxia on NRB  - No improved in ED, decision for intubation over BiPAP with initial concerns for possible COVID-19  - Admit to ICU, continue mechanical ventilation   - 2/2 Multifocal PNA   - Pulmonology consult     Multifocal Pneumonia   - Noted on CXR   - Resp Panel with + human metapneumovirus   - PCT and WBC WNL   - Continue Vanc/Cefepime D#2-->will hold abx at this time, family wishes for [facility] and plans to withdrawal care this evening     Atrial fibrillation   - noted after PEA arrest  - Given diltiazem bolus in ED, HR improved from 180's-->120's   - AC deferred 2/2 recent CPR and risk for trauma   - remains in atrial fibrillation with rates in the 120s     Type II NSTEMI  - Elevated troponin, s/p PEA arrest   - Continue tele monitor   - No AC started     Dementia   - supportive care     DVT Prophylaxis: Lovenox   Diet: Diet NPO Effective Now   Code Status: Full Code    Patients family has changed his code status to [facility] and plans to withdrawal care this evening when family is present. Will hold Abx dosing scheduled this evening. Can continue pressors, IVF and mechanical ventilation until then. Comfort care medication orders placed--not to be given until after extubation this evening. Bettina Houser PA-C  3/17/2020 9:22 AM      agree with above. pt seen and examined. D/W ICU RN    Acute resp failure, on Mech vent. S/P CPR .    family is planning on withdrawal of care this evening     Electronically signed by Manuelito Austin MD on 3/17/2020 2:52 PM

## 2020-03-17 NOTE — ED NOTES
Called nursing home to see if they called pt family they said they left message I called from the er @ [phone] and left message for his son Jaime Hidalgo @ [phone]     Adina [place]  03/17/20 6572

## 2020-03-17 NOTE — FLOWSHEET NOTE
referral from staff, rounding. Family awaits out of town family coming from [place] in order to withdraw care from patient. Family asked  about cafeteria, would likely go get something to eat as they wait on their family to come in town.  provided validation, discussed family Mandaen and izaiah history.  available to support as needed, on call available for evenings as needed. Sidonie Rising       03/17/20 3860   Encounter Summary   Services provided to: Family   Referral/Consult From: Nurse   Support System Family members   Place of [place]: [facility]   Contact [employer] No   Continue Visiting   (3/17-EOL referral per Nurse.  Family waiting on out of town)   Complexity of Encounter Moderate   Length of Encounter 15 minutes   Spiritual Assessment Completed Yes   Routine   Type Initial   Assessment Passive;Sad  (Son did not know about illness, sad)   Intervention Active listening;Explored feelings, thoughts, concerns;Explored coping resources   Outcome Expressed gratitude;Coping;Grieving

## 2020-03-17 NOTE — CONSULTS
Patient is being seen at the request of Dr. Mable Barksdale for a consultation for Acute respiratory failure with hypoxemia    HISTORY OF PRESENT ILLNESS: The patient is a 80-year-old man with a past medical history of mild dementia, resident of [address] [place] [address] who presented via EMS to the emergency department with hypoxemia. Patient was in respiratory distress and was emergently endotracheally intubated and placed on mechanical ventilation. Chest x-ray revealed likely pneumonia. He is intubated and sedated and unable to provide any further history. PAST MEDICAL HISTORY:  Past Medical History:   Diagnosis Date    Allergic rhinitis     Basal cell carcinoma of right ear 6/17/2017    Bilateral low back pain without sciatica 3/30/2016    Dementia ([address]     Hypertension     OA (osteoarthritis) 8/13/2018     PAST SURGICAL HISTORY:  Past Surgical History:   Procedure Laterality Date    APPENDECTOMY      [place] [address] INCL FLUOR GDNCE DX W/CELL WASHG SPX N/A 9/6/2018    BRONCHOSCOPY performed by Rg Eller MD at [facility] OFFICE/OUTPT [facility] Left 9/5/2018    LEFT HIP HEMIARTHROPLASTY performed by Ivania Grijalva MD at [address]:  Family history is unknown by patient. SOCIAL HISTORY:   reports that he has never smoked.  He has never used smokeless tobacco.    Scheduled Meds:   dilTIAZem        cefepime  2 g Intravenous Q12H    ipratropium-albuterol  1 ampule Inhalation Q4H WA    pantoprazole  40 mg Intravenous Daily    sodium chloride flush  10 mL Intravenous 2 times per day    enoxaparin  40 mg Subcutaneous Daily    mupirocin   Nasal BID     Continuous Infusions:   norepinephrine 4 mcg/min (03/17/20 0521)    sodium chloride      propofol       PRN Meds:  sodium chloride flush, acetaminophen **OR** acetaminophen, polyethylene glycol, promethazine **OR** ondansetron    ALLERGIES:  Patient is allergic to claritin [loratadine] and oxycodone-acetaminophen. REVIEW OF SYSTEMS:  Unobtainable as intubated and sedated    PHYSICAL EXAM:  Blood pressure (!) 91/56, pulse 116, temperature 98.9 °F (37.2 °C), resp. rate 25, weight 132 lb 4.4 oz (60 kg), SpO2 (!) 89 %.' on ventilator 80% FiO2  Gen: + distress. Appears stated age  Eyes: PERRL. No sclera icterus. No conjunctival injection. ENT: No discharge. Pharynx clear. Neck: Trachea midline. No obvious mass. Resp: + accessory muscle use. No crackles. No wheezes. + rhonchi. No dullness on percussion. CV: Regular rate. Regular rhythm. No murmur or rub. No edema. Peripheral pulses are 2+. Capillary refill is less than 3 seconds. GI: Non-tender. Non-distended. No hernia. Skin: Warm and dry. No nodule on exposed extremities. Lymph: No cervical LAD. No supraclavicular LAD. M/S: No cyanosis. No joint deformity. No clubbing. Neuro: Sedated and not moving extremities. LABS:  CBC:   Recent Labs     03/17/20  0345   WBC 9.4   HGB 12.0*   HCT 36.9*   MCV 94.8        BMP:   Recent Labs     03/17/20  0345      K 4.1      CO2 29   BUN 32*   CREATININE 0.9     LIVER PROFILE:   Recent Labs     03/17/20  0345   AST 33   ALT 23   BILITOT 0.3   ALKPHOS 70     PT/INR: No results for input(s): PROTIME, INR in the last 72 hours. APTT: No results for input(s): APTT in the last 72 hours. UA:No results for input(s): NITRITE, COLORU, PHUR, LABCAST, WBCUA, RBCUA, MUCUS, TRICHOMONAS, YEAST, BACTERIA, CLARITYU, SPECGRAV, LEUKOCYTESUR, UROBILINOGEN, BILIRUBINUR, BLOODU, GLUCOSEU, AMORPHOUS in the last 72 hours.     Invalid input(s): Stephen Gill  Recent Labs     03/17/20  0535 03/17/20  0833   PHART 7.177* 7.248*   FAH9PKO 64.0* 50.5*   PO2ART 81.1 54.2*       Chest imaging was reviewed by me and showed:  CXR Dense R sided ASD, ETT in place      ASSESSMENT:  ·  Acute respiratory failure with hypoxemia  · Severe RLL pneumonia- metapneumovirus plus possible bacterial infection  · S/p cardiac arrest  · Septic shock  · hyperglycemia    PLAN:  Mechanical ventilation per my orders. The ventilator was adjusted by me at the bedside for unstable, life threatening respiratory failure. Target tidal volume to 4-6 ml/kg IBW per [employer] ARMA study  Target plateau pressures <88 cm H2O  HOB greater than 30 degrees at all times  Daily SBT once FIO2<60% and PEEP = 5, patient arousable, hemodynamically stable  IV Sedation with propofol targeted RASS -2; prn fentanyl  Aggressive IVF resuscitation   adjust levophed gtt as needed for MAP >65  Serial CVP goal of 8-12 mm Hg  Repeat lactate until normalized  Broad spectrum antibiotics (Vancomycin and cefepime)  Blood, respiratory and urine cultures  D/c droplet plus precautions  Check fsbg  · ICU care- bactroban, lovenox  · Discuss goals of care with family    Patient is critically ill with acute respiratory failure. Critical care time spent reviewing labs/films, examining patient, collaborating with other physicians but excluding procedures for life threatening organ failure is 45 minutes. Addendum: Met with patient's son and daughter-in-law and discussed goals of care. They said the patient would not want to be maintained on current life support. The patient has another son who is arriving this evening at 7 PM.  At that time they anticipate extubation and implementation of comfort only measures.   We will change CODE STATUS to DNR CC.

## 2020-03-17 NOTE — ED NOTES
EKG shows afib with RVR per Dr. Rosa Edmondson. , , 96% on vent 31 RR, 86/72.       Jimbo Lopez RN  03/17/20 2736

## 2020-03-18 LAB — REPORT: NORMAL

## 2020-03-18 NOTE — DISCHARGE SUMMARY
Death  Summary     Patient ID:  Lucero Ortiz  [id number]  59 y.o.  1924    Admit date: 3/17/2020    Discharge date and time:   patient  at  on  3/17/2020    Admitting Physician: Kaley Ugarte MD     Discharge Physician: Ashwini Short    Admission Diagnoses: Acute hypoxemic respiratory failure [facility]) [J96.01]    Discharge Diagnoses: Active Problems:    Acute hypoxemic respiratory failure (HCC)    Pneumonia of right lower lobe due to methicillin resistant Staphylococcus aureus (MRSA) (HCC)    Multifocal pneumonia  Resolved Problems:    * No resolved hospital problems. *      Admission Condition: critical    Discharged Condition: pt     Indication for Admission and Hospital Course: The patient is a 80 y.o. male with dementia, HTN, arthritis who presented to [facility] ED with complaint of respiratory distress from his SNF.   Patient was brought to the ED with acute hypoxia at 80% on a nonrebreather. Full code . Patient required emergent intubation and this was performed. Patient became quite hypotensive after intubation and subsequently went into PEA arrest.  1 round of epi and CPR performed with ROSC subsequent on stable SVT. Cardioversion attempted without improvement and EKG showed atrial fibrillation with RVR. Patient was given a diltiazem bolus with improved heart rates from 180s down to 120s. Blood pressure was still low and Levophed was started via peripheral line until central venous catheter was placed. Chest x-ray showed concerns for multifocal pneumonia. Patient was given empiric antibiotics in the ED which were continued on admission. Patient was admitted to the ICU for further care     Patient is unresponsive , on mechanical vent . Patient's family met with intensivist and changed his code status to [facility] and plans to withdrawal of care in the  evening when family is present.      Family arrived evening of 3/17/2020  Pt  extubated at 8 pm. Comfort measures .   Pt  at 855 Antigen: pending   Strep pneumoniae: pending      EKG:  I have reviewed the EKG with the following interpretation:   Atrial fibrillation with rapid ventricular response, Non-specific intra-ventricular conduction block, Nonspecific T wave abnormality     RADIOLOGY  XR CHEST PORTABLE   Final Result   1. Right central venous catheter tip overlies the SVC, without pneumothorax. 2. Stable appearance of bibasilar pneumonia.           XR CHEST PORTABLE   Final Result   1. Endotracheal tube in appropriate position. 2. Enteric tube in the distal esophagus or possible hiatal hernia. 3. Bilateral multifocal consolidative changes consistent with multifocal   pneumonia.                CBC:   Recent Labs     20  034   WBC 9.4   RBC 3.89*   HGB 12.0*   HCT 36.9*   MCV 94.8   RDW 15.3        BMP:   Recent Labs     20  0345      K 4.1      CO2 29   BUN 32*   CREATININE 0.9     BNP: No results for input(s): BNP in the last 72 hours. PT/INR: No results for input(s): PROTIME, INR in the last 72 hours. APTT: No results for input(s): APTT in the last 72 hours.   CARDIAC ENZYMES:   Recent Labs     20  0345   TROPONINI 0.02*     FASTING LIPID PANEL:No results found for: CHOL, HDL, TRIG  LIVER PROFILE:   Recent Labs     20  0345   AST 33   ALT 23   BILITOT 0.3   ALKPHOS 70         Treatments: as above        Disposition:  Patient          Signed:  Rachel Torres  3/17/2020

## 2020-03-18 NOTE — PROGRESS NOTES
03/17/20 1546   Vent Information   Vent Type 840   Vent Mode AC/VC   Vt Ordered 350 mL   Rate Set 28 bmp   Peak Flow 60 L/min   Pressure Support 0 cmH20   FiO2  70 %   Sensitivity 3   PEEP/CPAP 12   I Time/ I Time % 0 s   Humidification Source Heated wire   Humidification Temp Measured 37   Vent Patient Data   High Peep/I Pressure 0   Peak Inspiratory Pressure 24 cmH2O   Mean Airway Pressure 16 cmH20   Rate Measured 28 br/min   Vt Exhaled 477 mL   Minute Volume 10.8 Liters   I:E Ratio 1:2.40   Plateau Pressure 21 BRE35   Cough/Sputum   Sputum How Obtained Endotracheal   Cough Productive   Sputum Amount Small   Sputum Color Creamy   Tenacity Thick   Spontaneous Breathing Trial (SBT) RT Doc   Pulse 117   SpO2 93 %   Breath Sounds   Right Upper Lobe Diminished   Right Middle Lobe Diminished   Right Lower Lobe Diminished   Left Upper Lobe Diminished   Left Lower Lobe Diminished   Additional Respiratory  Assessments   Resp 28   Position Semi-Gamez's   Oral Care Completed?  Yes   Oral Care Mouth suctioned   Alarm Settings   High Pressure Alarm 40 cmH2O   Low Minute Volume Alarm 2.5 L/min   High Respiratory Rate 40 br/min   ETT (adult)   Placement Date: 03/17/20   Type: Cuffed  Tube Size: 7.5 mm  Secured at: 25 cm  Measured From: Lips   Secured at 25 cm   Measured From Lips   ET Placement Right   Secured By Commercial tube orosco
4 Eyes Skin Assessment     The patient is being assess for   Admission    I agree that 2 RN's have performed a thorough Head to Toe Skin Assessment on the patient. ALL assessment sites listed below have been assessed. Areas assessed by both nurses:   [x]   Head, Face, and Ears   [x]   Shoulders, Back, and Chest, Abdomen  [x]   Arms, Elbows, and Hands   [x]   Coccyx, Sacrum, and Ischium  [x]   Legs, Feet, and Heels        Stage III to L buttock. DTI to R heel. Scabbed area on the patients left shin. Blanchable erythema to the patients heels. **SHARE this note so that the co-signing nurse is able to place an eSignature**    Co-signer eSignature: Electronically signed by Tk Graham RN on 3/17/20 at 8:10 AM EDT    Does the Patient have Skin Breakdown? Yes LDA WOUND CARE was Initiated documentation include the Yolis-wound, Wound Assessment, Measurements, Dressing Treatment, Drainage, and Color\",          Julius Prevention initiated:  Yes   Wound Care Orders initiated:  Yes      [address]  nurse consulted for Pressure Injury (Stage 3,4, Unstageable, DTI, NWPT, Complex wounds)and New or Established Ostomies:  Yes      Primary Nurse eSignature: Electronically signed by Rahul Hilario RN on 3/17/20 at 3:18 PM EDT      Patient is not able to demonstrate the ability to move from a reclining position to an upright position within the recliner due to being intubated and sedated. Brian Antoine
CVP is 4. 500 mL NS bolus given for at this time for pressures.  Electronically signed by Donald Beltrán RN on 3/17/2020 at 11:20 AM
Called into room by family at . No heart beat auscultated by this writer and Maggie Ha. Pt asystole on the monitor. Time of death, . Spoke with family at length and  home obtained. Perfect serve sent to Dr. Kennedy David and will notify Dr. Pro Spears as well. [facility] called at  and body to be released. Spoke with [facility] home [phone] and they will be up to get body. Clinical RN Jodi notified.  Rebecca Mcnamara RN
Family at bedside and have decided to withdraw care at this time. Extubation order in place and respiratory informed. Morphine 2 mg given prior to extubation and all gtts turned off. Pt extubated to 2 liters nasal cannula. Monitor changed over to comfort care. Will provide pt Ativan if needed for comfort as well.  Cora Last RN
Patient is moving his arms toward his et tube. Verbal re-direction attempted with no signs of understanding. Restraints applied to protect airway.  Ritchie Locke
Patient was extubated and placed on 2 lpm NC for comfort. Family at beside. No complications occurred during extubation.
Pharmacy Note  Vancomycin Consult    Tashi Rooney is a 80 y.o. male started on Vancomycin for PNA; consult received from Dr. Paola Van to manage therapy. Also receiving the following antibiotics: cefepime. Patient Active Problem List   Diagnosis    Essential hypertension    History of recent fall    Physical debility    OA (osteoarthritis)    Late onset Alzheimer's disease with behavioral disturbance (HCC)    Closed fracture of neck of left femur (HCC)    Acute hypoxemic respiratory failure (HCC)    Hypovitaminosis D    Atelectasis of both lungs    Mucus plugging of bronchi    Acute cystitis without hematuria    Arthritis    Basal cell carcinoma of right ear    Bilateral low back pain without sciatica    Gait instability    History of actinic keratoses    History of skin cancer    T12 compression fracture, initial encounter ([facility])    Weakness of both legs       Allergies:  Claritin [loratadine] and Oxycodone-acetaminophen     Temp max:     Recent Labs     03/17/20  0345   BUN 32*       Recent Labs     03/17/20  0345   CREATININE 0.9       Recent Labs     03/17/20  0345   WBC 9.4       No intake or output data in the 24 hours ending 03/17/20 1226    Culture Date      Source                       Results  Resp- Human Metapne    Ht Readings from Last 1 Encounters:   11/10/18 5' 6\" (1.676 m)        Wt Readings from Last 1 Encounters:   03/17/20 132 lb 4.4 oz (60 kg)         Body mass index is 21.35 kg/m². CrCl cannot be calculated (Unknown ideal weight.). Goal Trough Level: 12-18 mcg/mL    Assessment/Plan:  Will initiate vancomycin 1000 mg IV every 24 hours. Trough due on 20th at 0530  . Thank you for the consult. Will continue to follow.   Karthik Garcia Pharm D 3/17/715552:27 PM  .
Report to [facility]. The patients other son arrived at 65. Family is spending time with the patient and will let Thomas Whaley know when they are ready to extubate.  Ion Hair
Required reporting for death within 24hrs of removal of 2 point soft wrist restraints completed-added to facility database.  JULIA PRITCHETT   RN
Security called and pt taken down to [facility].  Gael Portillo RN
Spoke with Dr. Alicia Ledezma about the consult and improving blood gas. Continue current treatment and will discuss case during rounds. Called the patients son Mary Anne Bryant and he verbalized that he was the patients son. Explained the patients situation however he disconnected the call.  Electronically signed by Ho Grady RN on 3/17/2020 at 8:58 AM.
Observations navau bs, h20 ok   ETT (adult)   Placement Date: 03/17/20   Type: Cuffed  Tube Size: 7.5 mm  Secured at: 25 cm  Measured From: Lips   Secured at 25 cm   Measured From Lips   ET Placement Right   Secured By Commercial tube orosco   Site Condition Dry

## 2020-03-20 LAB
CULTURE, RESPIRATORY: ABNORMAL
CULTURE, RESPIRATORY: ABNORMAL
GRAM STAIN RESULT: ABNORMAL
ORGANISM: ABNORMAL

## 2020-03-21 LAB — BLOOD CULTURE, ROUTINE: NORMAL

## 2020-03-24 LAB
CULTURE, BLOOD 2: ABNORMAL
CULTURE, BLOOD 2: ABNORMAL
ORGANISM: ABNORMAL
ORGANISM: ABNORMAL

## 2020-12-29 NOTE — PROGRESS NOTES
Department of Orthopedic Surgery  Nurse Practitioner   Progress Note    Subjective:     Post-Operative Day: 2 Status Post left Hip Hemiarthroplasty  Systemic or Specific Complaints:No Complaints. Patient resting in bed. Bilateral wrist restraints. Therapy at bedside. Son at bedside. Baseline confusion. Objective:     Patient Vitals for the past 24 hrs:   BP Temp Temp src Pulse Resp SpO2 Weight   09/07/18 1039 (!) 145/89 97.5 °F (36.4 °C) Axillary 69 18 96 % -   09/07/18 0815 102/67 97.3 °F (36.3 °C) Axillary 71 16 - -   09/07/18 0622 (!) 94/59 - - 76 16 92 % -   09/07/18 0604 - - - - - - 131 lb 2.8 oz (59.5 kg)   09/07/18 0352 (!) 144/75 97.5 °F (36.4 °C) Axillary 96 18 96 % -   09/07/18 0145 112/66 - - 92 16 92 % -   09/07/18 0126 (!) 146/86 - - 88 18 100 % -   09/06/18 2300 133/76 97.5 °F (36.4 °C) Axillary 100 20 - -   09/06/18 1935 (!) 94/56 98.2 °F (36.8 °C) Axillary 89 18 - -   09/06/18 1524 127/79 97.4 °F (36.3 °C) Oral 103 20 98 % -   09/06/18 1300 102/60 - - - - - -   09/06/18 1246 (!) 89/51 - - 80 - 94 % -   09/06/18 1230 104/67 - - 101 - 99 % -       General: alert, appears stated age, cooperative and no distress   Wound: Wound clean and dry no evidence of infection. No dressing in place, incision CDI. Motion: Painful range of Motion   DVT Exam: No evidence of DVT seen on physical exam.       NVI in lower extremity. Thigh swollen but soft. Moving foot and ankle. Data Review  CBC: Lab Results   Component Value Date    WBC 9.9 09/07/2018    RBC 2.99 09/07/2018    HGB 9.6 09/07/2018    HCT 27.4 09/07/2018     09/07/2018       Assessment:     Status Post left Hip Hemiathroplasty. Doing well postoperatively. Plan:      1: Continues current post-op course : Continue current plan of care per IM. Post-op labs reviewed and stable. Pulm following. Replace mepilex dressing.    2:  Continue Deep venous thrombosis prophylaxis - Lovenox  3:  Continue physical therapy, OT, WBAT  4:  Continue Pain [FreeTextEntry1] : 71M with PAD and claudication, DM, HTN, HLD, CAD s/p PCI (RCA), former smoker who presents for follow up.\par \par Continues to have multiple procedures to bilateral LE for symptomatic PAD, stents, ballooning \par Continues to note calf claudication while walking, however overall improved per wife\par Early onset dementia, has been placed on Namenda. \par Denies chest pain, shortness of breath, dizziness, lightheadedness\par \par HISTORY:\par \par In October 2018, pt was seen in the office with worsening exertional chest pain, was sent for cardiac cath and was noted with 99% RCA occlusion s/p GELA.  \par \par ECG: SR, no ST-T wave changes\par Cath 2018 ([facility]): 100% RCA, other arteries normal\par TTE 9/2017: Normal , EF 78%\par LE Duplex: Moderate fem-pop disease bilaterally\par NST (2009): Mild inferior ischemia, EF 58%\par \par Asa 81mg, Clopidogrel 75mg, Losartan 50mg, Metoprolol 12.5mg BID, atorvastatin

## (undated) DEVICE — SINGLE USE SUCTION VALVE MAJ-209: Brand: SINGLE USE SUCTION VALVE (STERILE)

## (undated) DEVICE — BANDAGE GZ W6INXL2YD POLY STRTCH CNFRM CVR RL

## (undated) DEVICE — SUTURE VCRL SZ 2-0 L18IN ABSRB UD CT-1 L36MM 1/2 CIR J839D

## (undated) DEVICE — NEBULIZER AEROSOL TBNG 7 FT FOR ACUTE CARE NEBUTECH

## (undated) DEVICE — SUTURE PDS II SZ 0 L36IN ABSRB VLT L36MM CT-1 1/2 CIR Z346H

## (undated) DEVICE — GAUZE,SPONGE,4"X4",8PLY,STRL,LF,10/TRAY: Brand: MEDLINE

## (undated) DEVICE — SHEET,DRAPE,40X58,STERILE: Brand: MEDLINE

## (undated) DEVICE — CONMED SCOPE SAVER BITE BLOCK, 20X27 MM: Brand: SCOPE SAVER

## (undated) DEVICE — DRAPE,HIP,W/POUCHES,STERILE: Brand: MEDLINE

## (undated) DEVICE — SMARTGOWN BREATHABLE SURGICAL GOWN: Brand: CONVERTORS

## (undated) DEVICE — LENS EYEGLASS LTWT REPL DISP SAFEVIEW

## (undated) DEVICE — SUTURE PDS II SZ 0 L60IN ABSRB VLT L48MM CTX 1/2 CIR Z990G

## (undated) DEVICE — AIRLIFE™ ADULT AEROSOL MASK VINYL, UNDER-THE-CHIN STYLE: Brand: AIRLIFE™

## (undated) DEVICE — ENDO CARRY-ON PROCEDURE KIT INCLUDES SUCTION TUBING, LUBRICANT, GAUZE, BIOHAZARD STICKER, TRANSPORT PAD AND INTERCEPT BEDSIDE KIT.: Brand: ENDO CARRY-ON PROCEDURE KIT

## (undated) DEVICE — GLOVE ORANGE PI 8 1/2   MSG9085

## (undated) DEVICE — SUTURE VCRL + SZ 2-0 L18IN ABSRB UD CT1 L36MM 1/2 CIR VCP839D

## (undated) DEVICE — SYRINGE MED 50ML LUERSLIP TIP

## (undated) DEVICE — SUTURE PDS II SZ 1 L18IN ABSRB VLT CT-1 L36MM 1/2 CIR TAPR Z741D

## (undated) DEVICE — STAPLER SKIN H3.9MM WIRE DIA0.58MM CRWN 6.9MM 35 STPL FIX

## (undated) DEVICE — Device

## (undated) DEVICE — SUTURE VCRL + SZ 0 L18IN ABSRB UD L36MM CT-1 1/2 CIR VCP840D

## (undated) DEVICE — SINGLE USE BIOPSY VALVE MAJ-210: Brand: SINGLE USE BIOPSY VALVE (STERILE)

## (undated) DEVICE — SYRINGE MED 10ML SLIP TIP BLNT FILL AND LUERLOCK DISP